# Patient Record
Sex: FEMALE | Race: ASIAN | Employment: UNEMPLOYED | URBAN - METROPOLITAN AREA
[De-identification: names, ages, dates, MRNs, and addresses within clinical notes are randomized per-mention and may not be internally consistent; named-entity substitution may affect disease eponyms.]

---

## 2020-07-06 ENCOUNTER — OFFICE VISIT (OUTPATIENT)
Dept: ORTHOPEDIC SURGERY | Age: 45
End: 2020-07-06

## 2020-07-06 VITALS
OXYGEN SATURATION: 100 % | HEART RATE: 88 BPM | DIASTOLIC BLOOD PRESSURE: 63 MMHG | HEIGHT: 62 IN | SYSTOLIC BLOOD PRESSURE: 103 MMHG | WEIGHT: 157.4 LBS | RESPIRATION RATE: 12 BRPM | BODY MASS INDEX: 28.97 KG/M2 | TEMPERATURE: 98.2 F

## 2020-07-06 DIAGNOSIS — R20.0 NUMBNESS AND TINGLING OF RIGHT ARM: Primary | ICD-10-CM

## 2020-07-06 DIAGNOSIS — M46.1 SACROILIITIS (HCC): ICD-10-CM

## 2020-07-06 DIAGNOSIS — G54.0 THORACIC OUTLET SYNDROME: ICD-10-CM

## 2020-07-06 DIAGNOSIS — M25.512 CHRONIC LEFT SHOULDER PAIN: ICD-10-CM

## 2020-07-06 DIAGNOSIS — R20.2 NUMBNESS AND TINGLING OF RIGHT ARM: Primary | ICD-10-CM

## 2020-07-06 DIAGNOSIS — M54.50 LUMBAR SPINE PAIN: ICD-10-CM

## 2020-07-06 DIAGNOSIS — M53.3 SACROILIAC JOINT DYSFUNCTION OF RIGHT SIDE: ICD-10-CM

## 2020-07-06 DIAGNOSIS — M54.2 NECK PAIN: ICD-10-CM

## 2020-07-06 DIAGNOSIS — M54.12 CERVICAL RADICULOPATHY: ICD-10-CM

## 2020-07-06 DIAGNOSIS — G89.29 CHRONIC LEFT SHOULDER PAIN: ICD-10-CM

## 2020-07-06 RX ORDER — CYCLOBENZAPRINE HCL 10 MG
10 TABLET ORAL 2 TIMES DAILY
COMMUNITY

## 2020-07-06 NOTE — PROGRESS NOTES
[address]  [address], [address]  [place], [address] [place] [address] Street  Phone: [phone]  Fax: [phone]        Lex Barrera  : 1975  PCP: Iglesia Limon NP  2020    NEW PATIENT      HISTORY OF PRESENT ILLNESS  Garret Mccallum is a 39 y.o. female c/o neck pain radiating into the BUE that began after she fell when she was putting [place] decorations into the attic. In January, she began having limited ROM of both arms, and she has had difficulty putting on her bra, etc. She c/o numbness in the RUE from the elbow to the hand into all digits except the pinky. She wears a wrist brace on the right with no benefit. She c/o limited ROM of both shoulders (L>R) due to pain. Pt notes that she has been unable to drive because her RUE feels weak and numb. She also c/o low back pain radiating into the RLE into the bottom of the foot x 2 years. She has attended PT with some benefit. Pain Score: 7/10. Treatments patient has tried:  Physical therapy:Pending  Doing HEP: Unknown  Non-opioid medications: Yes - Flexeril  Spinal injections: No  Spinal surgery- No.   Last Spine MRI: None. PmHx: None. ASSESSMENT  This is a 39year-old female with neck pain with radiating numbness in the RUE and left shoulder pain following a fall out of the attic. Her RUE symptoms may be thoracic outlet syndrome vs peripheral entrapment, less likely a cervical radiculopathy. She also c/o low back pain radiating into the RLE. This pain is likely SI joint dysfunction on the R - she had 5/5 positive SI test on the R. PLAN  1. RUE EMG to evaluate thoracic outlet vs peripheral entrapment. 2. Referral to orthopedics for shoulder pain. 3. Right SI joint injection. 4. Referral to PT [facility])  5. Referral to chiropractic (Dr. Catherien Rodriguez)    Pt will f/u for RUE EMG or sooner if needed. Diagnoses and all orders for this visit:    1.  Numbness and tingling of right arm  -     EMG ONE EXTREMITY UPPER RT; Future  -     REFERRAL TO PHYSICAL THERAPY    2. Chronic left shoulder pain  -     REFERRAL TO ORTHOPEDICS  -     REFERRAL TO PHYSICAL THERAPY    3. Sacroiliitis ([address]  -     REFERRAL TO PHYSICAL THERAPY  -     SCHEDULE SURGERY    4. Sacroiliac joint dysfunction of right side  -     REFERRAL TO PHYSICAL THERAPY  -     SCHEDULE SURGERY    5. Thoracic outlet syndrome  -     EMG ONE EXTREMITY UPPER RT; Future  -     REFERRAL TO PHYSICAL THERAPY    6. Neck pain  -     REFERRAL TO PHYSICAL THERAPY  -     REFERRAL TO CHIROPRACTIC    7. Lumbar spine pain  -     REFERRAL TO PHYSICAL THERAPY  -     REFERRAL TO CHIROPRACTIC    8. Cervical radiculopathy  -     EMG ONE EXTREMITY UPPER RT; Future  -     REFERRAL TO PHYSICAL THERAPY         CHIEF COMPLAINT  Dionicio Guthrie is seen today in consultation at the request of Tori Martinez NP for complaints of neck pain radiating into RUE. PAST MEDICAL HISTORY   Past Medical History:   Diagnosis Date    History of uterine fibroid        Past Surgical History:   Procedure Laterality Date    HX  SECTION      x2       MEDICATIONS        ALLERGIES  No Known Allergies       SOCIAL HISTORY    Social History     Socioeconomic History    Marital status:      Spouse name: Not on file    Number of children: Not on file    Years of education: Not on file    Highest education level: Not on file   Tobacco Use    Smoking status: Never Smoker    Smokeless tobacco: Never Used   Substance and Sexual Activity    Alcohol use: Yes     Comment: occassionally       FAMILY HISTORY  History reviewed. No pertinent family history. REVIEW OF SYSTEMS  Review of Systems   Musculoskeletal: Positive for back pain and neck pain.         RUE paraesthesia  Left shoulder pain  RLE paraesthesia         PHYSICAL EXAMINATION  Visit Vitals  /63   Pulse 88   Temp 98.2 °F (36.8 °C) (Oral)   Resp 12   Ht 5' 2\" (1.575 m)   Wt 157 lb 6.4 oz (71.4 kg)   SpO2 100%   BMI 28.79 kg/m²         Pain Assessment  7/6/2020   Location of Pain Neck; Shoulder;Arm   Location Modifiers Right   Severity of Pain 7   Quality of Pain Sharp; Throbbing;Aching;Burning; Other (Comment)   Quality of Pain Comment \"numbness/tingling\"   Duration of Pain Persistent   Frequency of Pain Constant   Limiting Behavior Yes   Result of Injury No         Constitutional:  Well developed, well nourished, in no acute distress. Psychiatric: Affect and mood are appropriate. HEENT: Normocephalic, atraumatic. Extraocular movements intact. Integumentary: No rashes or abrasions noted on exposed areas. Cardiovascular: Regular rate and rhythm. Pulmonary: Clear to auscultation bilaterally. SPINE/MUSCULOSKELETAL EXAM    Cervical spine:  Neck is midline. Normal muscle tone. No focal atrophy is noted. ROM pain free. Shoulder ROM intact. Mild tenderness to palpation. Negative Spurling's sign. Negative Tinel's sign. Negative Lafleur's sign. Positive JAME test on the R.               Sensation in the bilateral arms grossly intact to light touch. Positive tinel at the right elbow (cubital tunnel). Positive tinel at the right wrist (carpal tunnel)    Lumbar spine:  No rash, ecchymosis, or gross obliquity. No fasciculations. No focal atrophy is noted. No pain with hip ROM. Full range of motion. Tenderness to palpation of right-sided lumbar paraspinals. No tenderness to palpation at the sciatic notch. SI joint tender on the R. Trochanters non tender. Sensation in the bilateral legs grossly intact to light touch.        LEFT RIGHT   MARCELA TEST + +   P4 TEST - +   COMPRESSION TEST + +   DISTRACTION TEST + +   GAENSLEN'S TEST - +         MOTOR:      Biceps  Triceps Deltoids Wrist Ext Wrist Flex Hand Intrin   Right 5/5 5/5 5/5 5/5 5/5 5/5   Left 5/5 5/5 5/5 5/5 5/5 5/5             Hip Flex  Quads Hamstrings Ankle DF EHL Ankle PF   Right 5/5 5/5 5/5 5/5 5/5 5/5   Left 5/5 5/5 5/5 5/5 5/5 5/5     DTRs are 2+ biceps, triceps, brachioradialis, patella, and Achilles. Negative Straight Leg raise. Squat not tested. No difficulty with tandem gait. Ambulation with single point cane. FWB. RADIOGRAPHS/DATA  Cervical Spine XR images taken on 2/12/2020 personally reviewed with patient:  VERTEBRAE AND ALIGNMENT: Segments are normal in height and alignment. DISC SPACES: Little or no significant disc space narrowing. FACET JOINTS AND FORAMINA: No significant facet DJD or bony foraminal stenosis. PARASPINOUS SOFT TISSUES: Unremarkable. ADDITIONAL: None.    _______________    IMPRESSION    No significant abnormality.  reviewed    Ms. Boyd Saravia has a reminder for a \"due or due soon\" health maintenance. I have asked that she contact her primary care provider for follow-up on this health maintenance. 35 minutes of face-to-face contact were spent with the patient during today's visit extensively discussing symptoms and treatment plan. All questions were answered. More than half of this visit today was spent on counseling. Written by Rashaun Jensen, as dictated by Dr. Joya Patino. I, Dr. Joya Patino, confirm that all documentation is accurate.

## 2020-07-06 NOTE — H&P (VIEW-ONLY)
[address]  [address], [address]  [place], [facility]  Phone: [phone]  Fax: [phone]        Tylor Pena  : 1975  PCP: Parisa Hall NP  2020    NEW PATIENT      HISTORY OF PRESENT ILLNESS  Maria C Hodges is a 39 y.o. female c/o neck pain radiating into the BUE that began after she fell when she was putting [place] decorations into the attic. In January, she began having limited ROM of both arms, and she has had difficulty putting on her bra, etc. She c/o numbness in the RUE from the elbow to the hand into all digits except the pinky. She wears a wrist brace on the right with no benefit. She c/o limited ROM of both shoulders (L>R) due to pain. Pt notes that she has been unable to drive because her RUE feels weak and numb. She also c/o low back pain radiating into the RLE into the bottom of the foot x 2 years. She has attended PT with some benefit. Pain Score: 7/10. Treatments patient has tried:  Physical therapy:Pending  Doing HEP: Unknown  Non-opioid medications: Yes - Flexeril  Spinal injections: No  Spinal surgery- No.   Last Spine MRI: None. PmHx: None. ASSESSMENT  This is a 39year-old female with neck pain with radiating numbness in the RUE and left shoulder pain following a fall out of the attic. Her RUE symptoms may be thoracic outlet syndrome vs peripheral entrapment, less likely a cervical radiculopathy. She also c/o low back pain radiating into the RLE. This pain is likely SI joint dysfunction on the R - she had 5/5 positive SI test on the R. PLAN  1. RUE EMG to evaluate thoracic outlet vs peripheral entrapment. 2. Referral to orthopedics for shoulder pain. 3. Right SI joint injection. 4. Referral to PT [facility])  5. Referral to chiropractic (Dr. Greg Irby)    Pt will f/u for RUE EMG or sooner if needed. Diagnoses and all orders for this visit:    1.  Numbness and tingling of right arm  -     EMG ONE EXTREMITY UPPER RT; Future  -     REFERRAL TO PHYSICAL THERAPY    2. Chronic left shoulder pain  -     REFERRAL TO ORTHOPEDICS  -     REFERRAL TO PHYSICAL THERAPY    3. Sacroiliitis ([address]  -     REFERRAL TO PHYSICAL THERAPY  -     SCHEDULE SURGERY    4. Sacroiliac joint dysfunction of right side  -     REFERRAL TO PHYSICAL THERAPY  -     SCHEDULE SURGERY    5. Thoracic outlet syndrome  -     EMG ONE EXTREMITY UPPER RT; Future  -     REFERRAL TO PHYSICAL THERAPY    6. Neck pain  -     REFERRAL TO PHYSICAL THERAPY  -     REFERRAL TO CHIROPRACTIC    7. Lumbar spine pain  -     REFERRAL TO PHYSICAL THERAPY  -     REFERRAL TO CHIROPRACTIC    8. Cervical radiculopathy  -     EMG ONE EXTREMITY UPPER RT; Future  -     REFERRAL TO PHYSICAL THERAPY         CHIEF COMPLAINT  Pendroy Pipes is seen today in consultation at the request of Martin Kline NP for complaints of neck pain radiating into RUE. PAST MEDICAL HISTORY   Past Medical History:   Diagnosis Date    History of uterine fibroid        Past Surgical History:   Procedure Laterality Date    HX  SECTION      x2       MEDICATIONS        ALLERGIES  No Known Allergies       SOCIAL HISTORY    Social History     Socioeconomic History    Marital status:      Spouse name: Not on file    Number of children: Not on file    Years of education: Not on file    Highest education level: Not on file   Tobacco Use    Smoking status: Never Smoker    Smokeless tobacco: Never Used   Substance and Sexual Activity    Alcohol use: Yes     Comment: occassionally       FAMILY HISTORY  History reviewed. No pertinent family history. REVIEW OF SYSTEMS  Review of Systems   Musculoskeletal: Positive for back pain and neck pain.         RUE paraesthesia  Left shoulder pain  RLE paraesthesia         PHYSICAL EXAMINATION  Visit Vitals  /63   Pulse 88   Temp 98.2 °F (36.8 °C) (Oral)   Resp 12   Ht 5' 2\" (1.575 m)   Wt 157 lb 6.4 oz (71.4 kg)   SpO2 100%   BMI 28.79 kg/m²         Pain Assessment  7/6/2020   Location of Pain Neck; Shoulder;Arm   Location Modifiers Right   Severity of Pain 7   Quality of Pain Sharp; Throbbing;Aching;Burning; Other (Comment)   Quality of Pain Comment \"numbness/tingling\"   Duration of Pain Persistent   Frequency of Pain Constant   Limiting Behavior Yes   Result of Injury No         Constitutional:  Well developed, well nourished, in no acute distress. Psychiatric: Affect and mood are appropriate. HEENT: Normocephalic, atraumatic. Extraocular movements intact. Integumentary: No rashes or abrasions noted on exposed areas. Cardiovascular: Regular rate and rhythm. Pulmonary: Clear to auscultation bilaterally. SPINE/MUSCULOSKELETAL EXAM    Cervical spine:  Neck is midline. Normal muscle tone. No focal atrophy is noted. ROM pain free. Shoulder ROM intact. Mild tenderness to palpation. Negative Spurling's sign. Negative Tinel's sign. Negative Lafleur's sign. Positive JAME test on the R.               Sensation in the bilateral arms grossly intact to light touch. Positive tinel at the right elbow (cubital tunnel). Positive tinel at the right wrist (carpal tunnel)    Lumbar spine:  No rash, ecchymosis, or gross obliquity. No fasciculations. No focal atrophy is noted. No pain with hip ROM. Full range of motion. Tenderness to palpation of right-sided lumbar paraspinals. No tenderness to palpation at the sciatic notch. SI joint tender on the R. Trochanters non tender. Sensation in the bilateral legs grossly intact to light touch.        LEFT RIGHT   MARCELA TEST + +   P4 TEST - +   COMPRESSION TEST + +   DISTRACTION TEST + +   GAENSLEN'S TEST - +         MOTOR:      Biceps  Triceps Deltoids Wrist Ext Wrist Flex Hand Intrin   Right 5/5 5/5 5/5 5/5 5/5 5/5   Left 5/5 5/5 5/5 5/5 5/5 5/5             Hip Flex  Quads Hamstrings Ankle DF EHL Ankle PF   Right 5/5 5/5 5/5 5/5 5/5 5/5   Left 5/5 5/5 5/5 5/5 5/5 5/5     DTRs are 2+ biceps, triceps, brachioradialis, patella, and Achilles. Negative Straight Leg raise. Squat not tested. No difficulty with tandem gait. Ambulation with single point cane. FWB. RADIOGRAPHS/DATA  Cervical Spine XR images taken on 2/12/2020 personally reviewed with patient:  VERTEBRAE AND ALIGNMENT: Segments are normal in height and alignment. DISC SPACES: Little or no significant disc space narrowing. FACET JOINTS AND FORAMINA: No significant facet DJD or bony foraminal stenosis. PARASPINOUS SOFT TISSUES: Unremarkable. ADDITIONAL: None.    _______________    IMPRESSION    No significant abnormality.  reviewed    Ms. Kojo Hammond has a reminder for a \"due or due soon\" health maintenance. I have asked that she contact her primary care provider for follow-up on this health maintenance. 35 minutes of face-to-face contact were spent with the patient during today's visit extensively discussing symptoms and treatment plan. All questions were answered. More than half of this visit today was spent on counseling. Written by Teofilo Banegas, as dictated by Dr. Veronica Vázquez. I, Dr. Veronica Vázquez, confirm that all documentation is accurate.

## 2020-07-06 NOTE — PATIENT INSTRUCTIONS
Electromyogram (EMG) and Nerve Conduction Studies: About These Tests What are they? An electromyogram (EMG) measures the electrical activity of your muscles when you are not using them (at rest) and when you tighten them (muscle contraction). Nerve conduction studies (NCS) measure how well and how fast the nerves can send electrical signals. EMG and nerve conduction studies are often done together. If they are done together, the nerve conduction studies are done before the EMG. Why are they done? You may need an EMG to find diseases that damage your muscles or nerves or to find why you can't move your muscles (paralysis), why they feel weak, or why they twitch. These problems may include a herniated disc, amyotrophic lateral sclerosis (ALS), or myasthenia gravis (MG). You may need nerve conduction studies to find damage to the nerves that lead from the brain and spinal cord to the rest of the body. (This is called the peripheral nervous system.) These studies are often used to help find nerve disorders, such as carpal tunnel syndrome. How do you prepare for these tests? · Wear loose-fitting clothing. You may be given a hospital gown to wear. · The electrodes for the test are attached to your skin. Your skin needs to be clean and free of sprays, oils, creams, and lotions. · You may be asked to sign a consent form that says you understand the risks of the test and agree to have it done. · Tell your doctor ALL the medicines, vitamins, supplements, and herbal remedies you take. Some may increase the risk of problems during your test. Your doctor will tell you if you should stop taking any of them before the test and how soon to do it. · If you take aspirin or some other blood thinner, ask your doctor if you should stop taking it before your test. Make sure that you understand exactly what your doctor wants you to do. These medicines increase the risk of bleeding. How are the tests done? You lie on a table or bed or sit in a reclining chair so your muscles are relaxed. For an EMG:  
· Your doctor will insert a needle electrode into a muscle. This will record the electrical activity while the muscle is at rest. 
· Your doctor will ask you to tighten the same muscle slowly and steadily while the electrical activity is recorded. · Your doctor may move the electrode to a different area of the muscle or a different muscle. For nerve conduction studies:  
· Your doctor will attach two types of electrodes to your skin. ? One type of electrode is placed over a nerve and will give the nerve an electrical pulse. ? The other type of electrode is placed over the muscle that the nerve controls. It will record how long it takes the muscle to react to the electrical pulse. How does having electromyogram (EMG) and nerve conduction studies feel? During an EMG test, you may feel a quick, sharp pain when the needle electrode is put into a muscle. With nerve conduction studies, you will be able to feel the electrical pulses. The tests make some people anxious. Keep in mind that only a very low-voltage electrical current is used. And each electrical pulse is very quick. It lasts less than a second. How long do they take? · An EMG may take 30 to 60 minutes. · Nerve conduction tests may take from 15 minutes to 1 hour or more. It depends on how many nerves and muscles your doctor tests. What happens after these tests? · After the test, you may be sore and feel a tingling in your muscles. This may last for up to 2 days. · If any of the test areas are sore: 
? Put ice or a cold pack on the area for 10 to 20 minutes at a time. Put a thin cloth between the ice and your skin. ? Take an over-the-counter pain medicine, such as acetaminophen (Tylenol), ibuprofen (Advil, Motrin), or naproxen (Aleve). Be safe with medicines. Read and follow all instructions on the label. · You will probably be able to go home right away. It depends on the reason for the test. 
· You can go back to your usual activities right away. When should you call for help? Watch closely for changes in your health, and be sure to contact your doctor if: · Muscle pain from an EMG test gets worse or you have swelling, tenderness, or pus at any of the needle sites. · You have any problems that you think may be from the test. 
· You have any questions about the test or have not received your results. Follow-up care is a key part of your treatment and safety. Be sure to make and go to all appointments, and call your doctor if you are having problems. It's also a good idea to keep a list of the medicines you take. Ask your doctor when you can expect to have your test results. Where can you learn more? Go to http://www.gray.com/ Enter N856 in the search box to learn more about \"Electromyogram (EMG) and Nerve Conduction Studies: About These Tests. \" Current as of: November 20, 2019               Content Version: 12.5 © 0530-7112 [employer]. Care instructions adapted under license by "[employer] (which disclaims liability or warranty for this information). If you have questions about a medical condition or this instruction, always ask your healthcare professional. [address] any warranty or liability for your use of this information.

## 2020-07-13 ENCOUNTER — HOSPITAL ENCOUNTER (OUTPATIENT)
Dept: PHYSICAL THERAPY | Age: 45
Discharge: HOME | End: 2020-07-13
Payer: OTHER GOVERNMENT

## 2020-07-13 PROCEDURE — 97162 PT EVAL MOD COMPLEX 30 MIN: CPT

## 2020-07-13 PROCEDURE — 97110 THERAPEUTIC EXERCISES: CPT

## 2020-07-13 PROCEDURE — 97112 NEUROMUSCULAR REEDUCATION: CPT

## 2020-07-13 NOTE — PROGRESS NOTES
[facility] 45  [address], [address] [phone] fax    Plan of Care/ Statement of Necessity for Physical Therapy Services           Patient name: Abe Offer Start of Care: 2020   Referral source: Zain Swartz MD : 1975    Medical Diagnosis: Pain in left shoulder [M25.512]  Cervicalgia [M54.2]  Low back pain [M54.5]  Numbness and tingling of right arm [R20.0, R20.2]  Payor: [place] / Plan: [address] / Product Type: [address] [place] [id number] /  Onset Date:chronic with exacerbation 2019    Treatment Diagnosis: LBP, left shoulder pain, right carpal tunnel    Prior Hospitalization: see medical history Provider#: [id number]   Medications: Verified on Patient summary List    Comorbidities: history of LBP   Prior Level of Function: functionally (I)    The Plan of Care and following information is based on the information from the initial evaluation. Assessment/ key information:   Ms. Tonja Sheridan is a 37yo female who presents to PT with signs and symptoms consistent with left RTC tendinosis, right carpal tunnel, and mechanical LBP that was exacerbated by a fall from the attic in 2019. Pt demonstrates decreased left shoulder AROM with infraspinatus trigger points, right positive Tinel and Phalen's test with decreased  strength, and poor TA endurance. Pt deficits impair her fine motor control, ability to reach overhead, don undergarments, and tolerate prolonged sitting/standing and walking.  Pt will benefit from skilled PT in order to address listed deficits, decrease pain, and maximize functional potential.     Evaluation Complexity History MEDIUM  Complexity : 1-2 comorbidities / personal factors will impact the outcome/ POC ; Examination HIGH Complexity : 4+ Standardized tests and measures addressing body structure, function, activity limitation and / or participation in recreation  ;Presentation MEDIUM Complexity : Evolving with changing characteristics  ; Clinical Decision Making MEDIUM Complexity : FOTO score of 26-74  Overall Complexity Rating: MEDIUM  Problem List: pain affecting function, decrease ROM, decrease strength, decrease ADL/ functional abilitiies, decrease activity tolerance and decrease flexibility/ joint mobility   Treatment Plan may include any combination of the following: Therapeutic exercise, Therapeutic activities, Neuromuscular re-education, Physical agent/modality, Gait/balance training, Manual therapy, Patient education, Self Care training, Functional mobility training and Home safety training  Patient / Family readiness to learn indicated by: asking questions, trying to perform skills and interest  Persons(s) to be included in education: patient (P)  Barriers to Learning/Limitations: None  Patient Goal (s): avoid medications, surgeries as much as possible  Patient Self Reported Health Status: fair  Rehabilitation Potential: excellent  Short Term Goals: To be accomplished in 2 weeks:  1. Pt will report compliance with HEP in order to supplement PT treatment   Eval = established  2. Pt will demonstrate left shoulder IR to T10 in order to improve ability to don undergarments   Eval = PSIS    Long Term Goals: To be accomplished in 9 treatments:  1. Pt will score at least 47 on FOTO in order to improve overall function, decrease pain, and facilitate return to PLOF. Eval = 28  2. Pt will demonstrate bilateral hip extension strength 5-/5 in order to increased ambulation distances in the community   Eval = bilaterally 4/5  3. Pt will demonstrate left shoulder strength at least 5-/5 in order to improve ability to lift objects overhead at home   Eval = 4/5 into all planes  4. Pt will demonstrate right  strength at least 43lbs in order to decrease frequency of dropping dishes at home   Eval = 35lbs. Frequency / Duration: Patient to be seen 2 times per week for 9 treatments.     Patient/ Caregiver education and instruction: Diagnosis, prognosis, self care, activity modification and exercises   [x]  Plan of care has been reviewed with MIKHAIL Varghese, PT 7/13/2020 2:06 PM    ________________________________________________________________________    I certify that the above Therapy Services are being furnished while the patient is under my care. I agree with the treatment plan and certify that this therapy is necessary.     Physician's Signature:____________Date:_________TIME:________    ** Signature, Date and Time must be completed for valid certification **    Please sign and return to [facility] 45  [address] [facility], [address] [phone] fax

## 2020-07-13 NOTE — PROGRESS NOTES
PT DAILY TREATMENT NOTE 10-18    Patient Name: Celestino Lewis  Date:2020  : 1975  [x]  Patient  Verified  Payor:  / Plan: [facility] / Product Type:  /    In time:115  Out time:201  Total Treatment Time (min): 46  Visit #: 1 of 9    Treatment Area: Pain in left shoulder [M25.512]  Cervicalgia [M54.2]  Low back pain [M54.5]  Numbness and tingling of right arm [R20.0, R20.2]    SUBJECTIVE  Pain Level (0-10 scale): 6.5  Any medication changes, allergies to medications, adverse drug reactions, diagnosis change, or new procedure performed?: [x] No    [] Yes (see summary sheet for update)  Subjective functional status/changes:   [] No changes reported  See evaluation     OBJECTIVE    26 min [x]Eval                  []Re-Eval        10 min Therapeutic Exercise:  [x] See flow sheet : explanation, demonstration, performance and distribution of HEP   Rationale: increase ROM and increase strength to improve the patients ability to perform ADLs    10 min Neuromuscular Re-education:  [x]  See flow sheet : explanation, demonstration, performance and distribution of HEP   Rationale: increase strength, improve coordination and increase proprioception  to improve the patients ability to tolerate sustained postures          With   [] TE   [] TA   [] neuro   [] other: Patient Education: [x] Review HEP    [] Progressed/Changed HEP based on:   [] positioning   [] body mechanics   [] transfers   [] heat/ice application    [] other:      Other Objective/Functional Measures: see evaluatino      Pain Level (0-10 scale) post treatment: 6    ASSESSMENT/Changes in Function: see POC    Patient will continue to benefit from skilled PT services to modify and progress therapeutic interventions, address functional mobility deficits, address ROM deficits, address strength deficits, analyze and address soft tissue restrictions, analyze and cue movement patterns, analyze and modify body mechanics/ergonomics, assess and modify postural abnormalities, address imbalance/dizziness and instruct in home and community integration to attain remaining goals. [x]  See Plan of Care  []  See progress note/recertification  []  See Discharge Summary         Progress towards goals / Updated goals:  Short Term Goals: To be accomplished in 2 weeks:  1. Pt will report compliance with HEP in order to supplement PT treatment   Eval = established  2. Pt will demonstrate left shoulder IR to T10 in order to improve ability to don undergarments   Eval = PSIS    Long Term Goals: To be accomplished in 9 treatments:  1. Pt will score at least 47 on FOTO in order to improve overall function, decrease pain, and facilitate return to PLOF. Eval = 28  2. Pt will demonstrate bilateral hip extension strength 5-/5 in order to increased ambulation distances in the community   Eval = bilaterally 4/5  3. Pt will demonstrate left shoulder strength at least 5-/5 in order to improve ability to lift objects overhead at home   Eval = 4/5 into all planes  4. Pt will demonstrate right  strength at least 43lbs in order to decrease frequency of dropping dishes at home   Eval = 35lbs.        PLAN  [x]  Upgrade activities as tolerated     [x]  Continue plan of care  []  Update interventions per flow sheet       []  Discharge due to:_  []  Other:_      Lesa Lin, PT 7/13/2020  2:06 PM    Future Appointments   Date Time Provider Trisha Bundy   8/10/2020  9:30 AM Nasreen Dickey  [address]   8/11/2020  4:30 PM [facility] RM 4 3D [facility]

## 2020-07-14 ENCOUNTER — APPOINTMENT (OUTPATIENT)
Dept: GENERAL RADIOLOGY | Age: 45
End: 2020-07-14
Attending: PHYSICAL MEDICINE & REHABILITATION
Payer: OTHER GOVERNMENT

## 2020-07-14 ENCOUNTER — HOSPITAL ENCOUNTER (OUTPATIENT)
Age: 45
Setting detail: OUTPATIENT SURGERY
Discharge: HOME | End: 2020-07-14
Attending: PHYSICAL MEDICINE & REHABILITATION | Admitting: PHYSICAL MEDICINE & REHABILITATION
Payer: OTHER GOVERNMENT

## 2020-07-14 VITALS
OXYGEN SATURATION: 97 % | HEART RATE: 81 BPM | TEMPERATURE: 93.9 F | SYSTOLIC BLOOD PRESSURE: 110 MMHG | DIASTOLIC BLOOD PRESSURE: 68 MMHG | RESPIRATION RATE: 20 BRPM

## 2020-07-14 LAB — HCG UR QL: NEGATIVE

## 2020-07-14 PROCEDURE — 76010000009 HC PAIN MGT 0 TO 30 MIN PROC: Performed by: PHYSICAL MEDICINE & REHABILITATION

## 2020-07-14 PROCEDURE — 74011000250 HC RX REV CODE- 250: Performed by: PHYSICAL MEDICINE & REHABILITATION

## 2020-07-14 PROCEDURE — 77030039433 HC TY MYLEOGRAM BD -B: Performed by: PHYSICAL MEDICINE & REHABILITATION

## 2020-07-14 PROCEDURE — 74011250637 HC RX REV CODE- 250/637: Performed by: PHYSICAL MEDICINE & REHABILITATION

## 2020-07-14 PROCEDURE — 81025 URINE PREGNANCY TEST: CPT

## 2020-07-14 PROCEDURE — 74011636320 HC RX REV CODE- 636/320: Performed by: PHYSICAL MEDICINE & REHABILITATION

## 2020-07-14 PROCEDURE — 74011250636 HC RX REV CODE- 250/636: Performed by: PHYSICAL MEDICINE & REHABILITATION

## 2020-07-14 RX ORDER — DEXAMETHASONE SODIUM PHOSPHATE 100 MG/10ML
INJECTION INTRAMUSCULAR; INTRAVENOUS AS NEEDED
Status: DISCONTINUED | OUTPATIENT
Start: 2020-07-14 | End: 2020-07-14 | Stop reason: HOSPADM

## 2020-07-14 RX ORDER — LIDOCAINE HYDROCHLORIDE 10 MG/ML
INJECTION, SOLUTION EPIDURAL; INFILTRATION; INTRACAUDAL; PERINEURAL AS NEEDED
Status: DISCONTINUED | OUTPATIENT
Start: 2020-07-14 | End: 2020-07-14 | Stop reason: HOSPADM

## 2020-07-14 RX ORDER — DIAZEPAM 5 MG/1
2.5-1 TABLET ORAL ONCE
Status: COMPLETED | OUTPATIENT
Start: 2020-07-14 | End: 2020-07-14

## 2020-07-14 RX ADMIN — DIAZEPAM 5 MG: 5 TABLET ORAL at 07:43

## 2020-07-14 NOTE — PROCEDURES
Procedure Note    Patient Name: Haley Lorenzo    Date of Procedure: July 14, 2020    Preoperative Diagnosis:Sacroiliac Dysfunction    Post Operative Diagnosis: same    Procedure: SI Joint Injection, Intra-articular and extra-articular - Unilateral  right    Consent: Informed consent was obtained prior to the procedure. The patient was given the opportunity to ask questions regarding the procedure and its associated risks. In addition to the potential risks associated with the procedure itself, the patient was informed both verbally and in writing of potential side effects of the use of corticosteroids. The patient appeared to comprehend the informed consent and desired to have the procedure performed. The patient was counseled at length about the risks of redd Covid-19 during their perioperative period and any recovery window from their procedure. The patient was made aware that redd Covid-19  may worsen their prognosis for recovering from their procedure and lend to a higher morbidity and/or mortality risk. All material risks, benefits, and reasonable alternatives including postponing the procedure were discussed. The patient does  wish to proceed with the procedure at this time. Procedure: The patient was placed in the prone position on the flouroscopy table and the back was prepped and draped in the usual sterile manner. After local Lidocaine 1% infiltration, a #22 gauge spinal needle was then advanced to lie within the Sacroiliac Joint. Yes a small amount of Isovue was used to confirm placement, no vascular uptake was identified. A total of 10 mg of Dexamethasone  and 5 ml of Lidocaine was introduced in and around the joint. The injection area was cleaned and bandaids applied. No excessive bleeding was noted. Patient dressed and was discharged to home with instructions. Discussion:  The patient tolerated the procedure well.         Danielle Salvador MD  July 14, 2020

## 2020-07-14 NOTE — INTERVAL H&P NOTE
Update History & Physical    The Patient's History and Physical of July 6, 2020 was reviewed with the patient and I examined the patient. There was no change. The surgical site was confirmed by the patient and me. Plan:  The risk, benefits, expected outcome, and alternative to the recommended procedure have been discussed with the patient. Patient understands and wants to proceed with the procedure.     Electronically signed by Tan Cotto MD on 7/14/2020 at 8:33 AM

## 2020-07-14 NOTE — DISCHARGE INSTRUCTIONS
[facility]   ([place])  Dr. Eduarda Carrasco, Dr. Nubia Chen, Dr. Arrington Covert not drive a car, operate heavy machinery or dangerous equipment for 24 hours. * Activity as tolerated; rest for the remainder of the day. * Resume pre-block medications including those for your family doctor. * Do not drink alcoholic beverages for 24 hours. Alcohol and the medications you have received may interact and cause an adverse reaction. * You may feel better this evening and worse tomorrow, as the numbing medications wears off and the steroid has yet to begin to work. After 48 hrs the steroid should begin to release bringing you relief. * You may shower this evening and remove any bandages. * Avoid hot tubs and heating pads for 24 hours. You may use cold packs on the procedure site as tolerated for the first 24 hours. * If a headache develops, drink plenty of fluids and rest.  Take over the counter medications for headache if needed. If the headache continues longer than 24 hours, call MD at the [address] [place] [address]. [phone]    * Continue taking pain medications as needed. * You may resume your regular diet if tolerated. Otherwise, start with sips of water and advance slowly. * If Diabetic: check your blood sugar three times a day for the next 3 days. If your sugar is greater than 300 call your family doctor. If your sugar is greater than 400, have someone transport you to the nearest Emergency Room. * If you experience any of the following problems, Please Call the [address] [place] [address] at [phone].         * Shortness of Breath    * Fever of 101 or higher    * Nausea / Vomiting    * Severe Headache    * Weakness or numbness in arms or legs that is not      resolving    * Prolonged increase in pain greater than 4 days      DISCHARGE SUMMARY from Nurse      PATIENT INSTRUCTIONS:    After oral sedation, for 24 hours or while taking prescription Narcotics:  · Limit your activities  · Do not drive and operate hazardous machinery  · Do not make important personal or business decisions  · Do  not drink alcoholic beverages  · If you have not urinated within 8 hours after discharge, please contact your surgeon on call. Report the following to your surgeon:  · Excessive pain, swelling, redness or odor of or around the surgical area  · Temperature over 101  · Nausea and vomiting lasting longer than 4 hours or if unable to take medications  · Any signs of decreased circulation or nerve impairment to extremity: change in color, persistent  numbness, tingling, coldness or increase pain  · Any questions            What to do at Home:  Recommended activity: Activity as tolerated, NO DRIVING FOR 12 Hours post injection          *  Please give a list of your current medications to your Primary Care Provider. *  Please update this list whenever your medications are discontinued, doses are      changed, or new medications (including over-the-counter products) are added. *  Please carry medication information at all times in case of emergency situations. These are general instructions for a healthy lifestyle:    No smoking/ No tobacco products/ Avoid exposure to second hand smoke    Surgeon General's Warning:  Quitting smoking now greatly reduces serious risk to your health. Obesity, smoking, and sedentary lifestyle greatly increases your risk for illness    A healthy diet, regular physical exercise & weight monitoring are important for maintaining a healthy lifestyle    You may be retaining fluid if you have a history of heart failure or if you experience any of the following symptoms:  Weight gain of 3 pounds or more overnight or 5 pounds in a week, increased swelling in our hands or feet or shortness of breath while lying flat in bed.   Please call your doctor as soon as you notice any of these symptoms; do not wait until your next office visit. Recognize signs and symptoms of STROKE:    F-face looks uneven    A-arms unable to move or move unevenly    S-speech slurred or non-existent    T-time-call 911 as soon as signs and symptoms begin-DO NOT go       Back to bed or wait to see if you get better-TIME IS BRAIN.

## 2020-07-17 ENCOUNTER — HOSPITAL ENCOUNTER (OUTPATIENT)
Dept: PHYSICAL THERAPY | Age: 45
Discharge: HOME | End: 2020-07-17
Payer: OTHER GOVERNMENT

## 2020-07-17 PROCEDURE — 97112 NEUROMUSCULAR REEDUCATION: CPT

## 2020-07-17 PROCEDURE — 97110 THERAPEUTIC EXERCISES: CPT

## 2020-07-17 NOTE — PROGRESS NOTES
PT DAILY TREATMENT NOTE 10-18    Patient Name: Herberth Felipe  Date:2020  : 1975  [x]  Patient  Verified  Payor: DEB / Plan: [facility]in Aguirre 74 / Product Type: Jarrod [employer] /    In time:1016  Out time:1103  Total Treatment Time (min): 47  Visit #: 2 of 9    Treatment Area: Pain in left shoulder [M25.512]  Cervicalgia [M54.2]  Low back pain [M54.5]  Numbness and tingling of right arm [R20.0, R20.2]    SUBJECTIVE  Pain Level (0-10 scale): 5  Any medication changes, allergies to medications, adverse drug reactions, diagnosis change, or new procedure performed?: [x] No    [] Yes (see summary sheet for update)  Subjective functional status/changes:   [] No changes reported  'sore. \"    OBJECTIVE    23Modality rationale: PD   Min Type Additional Details    [] Estim:  []Unatt       []IFC  []Premod                        []Other:  []w/ice   []w/heat  Position:  Location:    [] Estim: []Att    []TENS instruct  []NMES                    []Other:  []w/US   []w/ice   []w/heat  Position:  Location:    []  Traction: [] Cervical       []Lumbar                       [] Prone          []Supine                       []Intermittent   []Continuous Lbs:  [] before manual  [] after manual    []  Ultrasound: []Continuous   [] Pulsed                           []1MHz   []3MHz W/cm2:  Location:    []  Iontophoresis with dexamethasone         Location: [] Take home patch   [] In clinic    []  Ice     []  heat  []  Ice massage  []  Laser   []  Anodyne Position:  Location:    []  Laser with stim  []  Other:  Position:  Location:    []  Vasopneumatic Device Pressure:       [] lo [] med [] hi   Temperature: [] lo [] med [] hi   [] Skin assessment post-treatment:  []intact []redness- no adverse reaction    []redness  adverse reaction:         23 min Therapeutic Exercise:  [x]?  See flow sheet : explanation, demonstration, performance and distribution of HEP   Rationale: increase ROM and increase strength to improve the patients ability to perform ADLs     24 min Neuromuscular Re-education:  [x]? See flow sheet : explanation, demonstration, performance and distribution of HEP   Rationale: increase strength, improve coordination and increase proprioception  to improve the patients ability to tolerate sustained postures            With   [] TE   [] TA   [] neuro   [] other: Patient Education: [x] Review HEP    [] Progressed/Changed HEP based on:   [] positioning   [] body mechanics   [] transfers   [] heat/ice application    [] other:      Other Objective/Functional Measures:     Pain Level (0-10 scale) post treatment: 4.5    ASSESSMENT/Changes in Function: Ms. Hayden Cornell did well with initiation of exercises. Some cuing for squat form and median nerve glides. No change in right hand symptoms with chin tucks. Patient will continue to benefit from skilled PT services to modify and progress therapeutic interventions, address functional mobility deficits, address ROM deficits, address strength deficits, analyze and address soft tissue restrictions, analyze and cue movement patterns, analyze and modify body mechanics/ergonomics, assess and modify postural abnormalities, address imbalance/dizziness and instruct in home and community integration to attain remaining goals. []  See Plan of Care  []  See progress note/recertification  []  See Discharge Summary         Progress towards goals / Updated goals:  Short Term Goals: To be accomplished in 2 weeks:  1. Pt will report compliance with HEP in order to supplement PT treatment              Eval = established   met  2. Pt will demonstrate left shoulder IR to T10 in order to improve ability to don undergarments              Eval = PSIS      Long Term Goals: To be accomplished in 9 treatments:  1. Pt will score at least 47 on FOTO in order to improve overall function, decrease pain, and facilitate return to PLOF. Eval = 28  2.    Pt will demonstrate bilateral hip extension strength 5-/5 in order to increased ambulation distances in the community              Eval = bilaterally 4/5  3. Pt will demonstrate left shoulder strength at least 5-/5 in order to improve ability to lift objects overhead at home              Eval = 4/5 into all planes  4.    Pt will demonstrate right  strength at least 43lbs in order to decrease frequency of dropping dishes at home              Eval = 35lbs.        PLAN  []  Upgrade activities as tolerated     [x]  Continue plan of care  []  Update interventions per flow sheet       []  Discharge due to:_  []  Other:_      Apurva Hernandez, PT 7/17/2020  10:28 AM    Future Appointments   Date Time Provider Trisha Bundy   7/20/2020 11:00 AM Wyonia Cage, PT [facility]   7/23/2020 11:00 AM Wyonia Cage, PT [facility]   7/27/2020 11:00 AM Wyonia Cage, PT [facility]   7/30/2020 11:00 AM Wyonia Cage, PT [facility]   8/3/2020 11:00 AM Eleanora Frames, PT [facility]   8/6/2020 11:00 AM Wyonia Cage, PT [facility]   8/10/2020  9:30 AM John Ceballos  [address]   8/10/2020 11:00 AM Eleanora Frames, PT [facility]   8/11/2020  4:30 PM [facility] RM 4 3D [facility]

## 2020-07-20 ENCOUNTER — HOSPITAL ENCOUNTER (OUTPATIENT)
Dept: PHYSICAL THERAPY | Age: 45
Discharge: HOME | End: 2020-07-20
Payer: OTHER GOVERNMENT

## 2020-07-20 PROCEDURE — 97110 THERAPEUTIC EXERCISES: CPT

## 2020-07-20 PROCEDURE — 97112 NEUROMUSCULAR REEDUCATION: CPT

## 2020-07-20 PROCEDURE — 97530 THERAPEUTIC ACTIVITIES: CPT

## 2020-07-23 ENCOUNTER — HOSPITAL ENCOUNTER (OUTPATIENT)
Dept: PHYSICAL THERAPY | Age: 45
Discharge: HOME | End: 2020-07-23
Payer: OTHER GOVERNMENT

## 2020-07-23 PROCEDURE — 97110 THERAPEUTIC EXERCISES: CPT

## 2020-07-23 PROCEDURE — 97112 NEUROMUSCULAR REEDUCATION: CPT

## 2020-07-23 PROCEDURE — 97530 THERAPEUTIC ACTIVITIES: CPT

## 2020-07-23 NOTE — PROGRESS NOTES
PT DAILY TREATMENT NOTE 10-18    Patient Name: Edgar Nunn  Date:2020  : 1975  [x]  Patient  Verified  Payor: DEB / Plan: Eduin Aguirre 74 / Product Type:  /    In time:1100  Out time:1140  Total Treatment Time (min): 40  Visit #: 4 of 9    Treatment Area: Pain in left shoulder [M25.512]  Cervicalgia [M54.2]  Low back pain [M54.5]  Numbness and tingling of right arm [R20.0, R20.2]    SUBJECTIVE  Pain Level (0-10 scale): 5  Any medication changes, allergies to medications, adverse drug reactions, diagnosis change, or new procedure performed?: [x] No    [] Yes (see summary sheet for update)  Subjective functional status/changes:   [] No changes reported  \"I'm doing     OBJECTIVE            23Modality rationale: PD   Min Type Additional Details       []? ? Estim:  []?? Unatt       []? ?IFC  []? ?Premod                        []? ? Other:  []??w/ice   []? ?w/heat  Position:  Location:       []? ? Estim: []??Att    []? ?TENS instruct  []? ?NMES                    []? ? Other:  []??w/US   []? ?w/ice   []? ?w/heat  Position:  Location:       []? ?  Traction: []?? Cervical       []? ?Lumbar                       []? ? Prone          []? ?Supine                       []? ?Intermittent   []? ? Continuous Lbs:  []?? before manual  []? ? after manual       []? ?  Ultrasound: []??Continuous   []? ? Pulsed                           []? ?1MHz   []? ? 3MHz W/cm2:  Location:       []? ?  Iontophoresis with dexamethasone         Location: []?? Take home patch   []? ? In clinic       []? ?  Ice     []? ?  heat  []? ?  Ice massage  []? ?  Laser   []? ?  Anodyne Position:  Location:       []? ?  Laser with stim  []? ?  Other:  Position:  Location:       []? ?  Vasopneumatic Device Pressure:       []? ? lo []? ? med []?? hi   Temperature: []?? lo []? ? med []?? hi     []? ? Skin assessment post-treatment:  []??intact []? ?redness- no adverse reaction    []? ?redness  adverse reaction:         10 min Therapeutic Exercise:  [x]? ?? See flow sheet :   Rationale: increase ROM and increase strength to improve the patients ability to perform ADLs     20 min Neuromuscular Re-education:  [x]? ??  See flow sheet :    Rationale: increase strength, improve coordination and increase proprioception  to improve the patients ability to tolerate sustained postures     10 min Therapeutic Activity:  [x]? See flow sheet :squatting, reaching activities. Rationale: increase ROM, increase strength, improve coordination, improve balance and increase proprioception  to improve the patients ability to perform ADLs with less pain.                With   [] TE   [] TA   [] neuro   [] other: Patient Education: [x] Review HEP    [] Progressed/Changed HEP based on:   [] positioning   [] body mechanics   [] transfers   [] heat/ice application    [] other:      Other Objective/Functional Measures:      Pain Level (0-10 scale) post treatment: 4    ASSESSMENT/Changes in Function: progressed core and shoulder stability exercises with quadruped and planks to help with stabilization with more advanced activities. Did well with weight progression during shoulder strengthening. Patient will continue to benefit from skilled PT services to modify and progress therapeutic interventions, address functional mobility deficits, address ROM deficits, address strength deficits, analyze and address soft tissue restrictions, analyze and cue movement patterns, analyze and modify body mechanics/ergonomics, assess and modify postural abnormalities, address imbalance/dizziness and instruct in home and community integration to attain remaining goals.      []  See Plan of Care  []  See progress note/recertification  []  See Discharge Summary         Progress towards goals / Updated goals:  Short Term Goals: To be accomplished in 2 weeks:  1.  Pt will report compliance with HEP in order to supplement PT treatment              Eval = established              met  2.  Pt will demonstrate left shoulder IR to T10 in order to improve ability to don undergarments              Eval = PSIS      Long Term Goals: To be accomplished in 9 treatments:  1.   Pt will score at least 47 on FOTO in order to improve overall function, decrease pain, and facilitate return to PLOF.             Eval = 28    Reassess at end of POC  2.   Pt will demonstrate bilateral hip extension strength 5-/5 in order to increased ambulation distances in the community              Eval = bilaterally 4/5  3.   Pt will demonstrate left shoulder strength at least 5-/5 in order to improve ability to lift objects overhead at home              Eval = 4/5 into all planes  4.   Pt will demonstrate right  strength at least 43lbs in order to decrease frequency of dropping dishes at home              Eval = 35lbs.      PLAN  [x]  Upgrade activities as tolerated     []  Continue plan of care  []  Update interventions per flow sheet       []  Discharge due to:_  []  Other:_      Kristan Solorzano, PT 7/23/2020  11:07 AM    Future Appointments   Date Time Provider Trisha Bundy   7/30/2020 11:00 AM Roseanna Nyhan, PT [facility]   7/31/2020  8:00 AM Eze Morales, PT [facility]   8/3/2020 11:00 AM Elke Irby, PT [facility]   8/6/2020 11:00 AM Roseanna Nyhan, PT [facility]   8/7/2020  2:00 PM Domenica Lopez MD [facility]   8/10/2020  9:30 AM Nic Richmond  [address]    8/10/2020 11:00 AM Elke Irby PT [facility]   8/11/2020  4:30 PM [facility] [address] [facility]

## 2020-07-27 ENCOUNTER — APPOINTMENT (OUTPATIENT)
Dept: PHYSICAL THERAPY | Age: 45
End: 2020-07-27
Payer: OTHER GOVERNMENT

## 2020-07-30 ENCOUNTER — APPOINTMENT (OUTPATIENT)
Dept: PHYSICAL THERAPY | Age: 45
End: 2020-07-30
Payer: OTHER GOVERNMENT

## 2020-07-31 ENCOUNTER — APPOINTMENT (OUTPATIENT)
Dept: PHYSICAL THERAPY | Age: 45
End: 2020-07-31
Payer: OTHER GOVERNMENT

## 2020-08-03 ENCOUNTER — APPOINTMENT (OUTPATIENT)
Dept: PHYSICAL THERAPY | Age: 45
End: 2020-08-03

## 2020-08-06 ENCOUNTER — APPOINTMENT (OUTPATIENT)
Dept: PHYSICAL THERAPY | Age: 45
End: 2020-08-06

## 2020-08-10 ENCOUNTER — VIRTUAL VISIT (OUTPATIENT)
Dept: ORTHOPEDIC SURGERY | Age: 45
End: 2020-08-10

## 2020-08-10 ENCOUNTER — APPOINTMENT (OUTPATIENT)
Dept: PHYSICAL THERAPY | Age: 45
End: 2020-08-10

## 2020-08-10 DIAGNOSIS — M53.3 SACROILIAC JOINT DYSFUNCTION OF RIGHT SIDE: ICD-10-CM

## 2020-08-10 DIAGNOSIS — R20.2 NUMBNESS AND TINGLING OF RIGHT ARM: Primary | ICD-10-CM

## 2020-08-10 DIAGNOSIS — G89.29 CHRONIC LEFT SHOULDER PAIN: ICD-10-CM

## 2020-08-10 DIAGNOSIS — M25.512 CHRONIC LEFT SHOULDER PAIN: ICD-10-CM

## 2020-08-10 DIAGNOSIS — M54.12 CERVICAL RADICULOPATHY: ICD-10-CM

## 2020-08-10 DIAGNOSIS — R20.0 NUMBNESS AND TINGLING OF RIGHT ARM: Primary | ICD-10-CM

## 2020-08-10 DIAGNOSIS — M54.50 LUMBAR SPINE PAIN: ICD-10-CM

## 2020-08-10 DIAGNOSIS — M54.2 NECK PAIN: ICD-10-CM

## 2020-08-10 DIAGNOSIS — M46.1 SACROILIITIS (HCC): ICD-10-CM

## 2020-08-10 DIAGNOSIS — G54.0 THORACIC OUTLET SYNDROME: ICD-10-CM

## 2020-08-10 NOTE — PATIENT INSTRUCTIONS
Electromyogram (EMG) and Nerve Conduction Studies: About These Tests What are they? An electromyogram (EMG) measures the electrical activity of your muscles when you are not using them (at rest) and when you tighten them (muscle contraction). Nerve conduction studies (NCS) measure how well and how fast the nerves can send electrical signals. EMG and nerve conduction studies are often done together. If they are done together, the nerve conduction studies are done before the EMG. Why are they done? You may need an EMG to find diseases that damage your muscles or nerves or to find why you can't move your muscles (paralysis), why they feel weak, or why they twitch. These problems may include a herniated disc, amyotrophic lateral sclerosis (ALS), or myasthenia gravis (MG). You may need nerve conduction studies to find damage to the nerves that lead from the brain and spinal cord to the rest of the body. (This is called the peripheral nervous system.) These studies are often used to help find nerve disorders, such as carpal tunnel syndrome. How do you prepare for these tests? · Wear loose-fitting clothing. You may be given a hospital gown to wear. · The electrodes for the test are attached to your skin. Your skin needs to be clean and free of sprays, oils, creams, and lotions. · You may be asked to sign a consent form that says you understand the risks of the test and agree to have it done. · Tell your doctor ALL the medicines, vitamins, supplements, and herbal remedies you take. Some may increase the risk of problems during your test. Your doctor will tell you if you should stop taking any of them before the test and how soon to do it. · If you take aspirin or some other blood thinner, ask your doctor if you should stop taking it before your test. Make sure that you understand exactly what your doctor wants you to do. These medicines increase the risk of bleeding. How are the tests done? You lie on a table or bed or sit in a reclining chair so your muscles are relaxed. For an EMG:  
· Your doctor will insert a needle electrode into a muscle. This will record the electrical activity while the muscle is at rest. 
· Your doctor will ask you to tighten the same muscle slowly and steadily while the electrical activity is recorded. · Your doctor may move the electrode to a different area of the muscle or a different muscle. For nerve conduction studies:  
· Your doctor will attach two types of electrodes to your skin. ? One type of electrode is placed over a nerve and will give the nerve an electrical pulse. ? The other type of electrode is placed over the muscle that the nerve controls. It will record how long it takes the muscle to react to the electrical pulse. How does having electromyogram (EMG) and nerve conduction studies feel? During an EMG test, you may feel a quick, sharp pain when the needle electrode is put into a muscle. With nerve conduction studies, you will be able to feel the electrical pulses. The tests make some people anxious. Keep in mind that only a very low-voltage electrical current is used. And each electrical pulse is very quick. It lasts less than a second. How long do they take? · An EMG may take 30 to 60 minutes. · Nerve conduction tests may take from 15 minutes to 1 hour or more. It depends on how many nerves and muscles your doctor tests. What happens after these tests? · After the test, you may be sore and feel a tingling in your muscles. This may last for up to 2 days. · If any of the test areas are sore: 
? Put ice or a cold pack on the area for 10 to 20 minutes at a time. Put a thin cloth between the ice and your skin. ? Take an over-the-counter pain medicine, such as acetaminophen (Tylenol), ibuprofen (Advil, Motrin), or naproxen (Aleve). Be safe with medicines. Read and follow all instructions on the label. · You will probably be able to go home right away. It depends on the reason for the test. 
· You can go back to your usual activities right away. When should you call for help? Watch closely for changes in your health, and be sure to contact your doctor if: · Muscle pain from an EMG test gets worse or you have swelling, tenderness, or pus at any of the needle sites. · You have any problems that you think may be from the test. 
· You have any questions about the test or have not received your results. Follow-up care is a key part of your treatment and safety. Be sure to make and go to all appointments, and call your doctor if you are having problems. It's also a good idea to keep a list of the medicines you take. Ask your doctor when you can expect to have your test results. Where can you learn more? Go to http://bran-kamryn.info/ Enter G538 in the search box to learn more about \"Electromyogram (EMG) and Nerve Conduction Studies: About These Tests. \" Current as of: November 20, 2019               Content Version: 12.5 © 3841-7935 [employer]. Care instructions adapted under license by [employer] (which disclaims liability or warranty for this information). If you have questions about a medical condition or this instruction, always ask your healthcare professional. [address] any warranty or liability for your use of this information.

## 2020-08-10 NOTE — PROGRESS NOTES
[address]  [address], [address]  [place], [address] [place] [address] Street  Phone: [phone]  Fax: [phone]        Yadira Smith  : 1975  PCP: Cris Blackwell NP  8/10/2020    PROGRESS NOTE    Consent: Lesta Boas is a 39 y.o. female who was seen by synchronous (real-time) audio-video technology, and/or her healthcare decision maker, is aware that this patient-initiated, Telehealth encounter on 8/10/2020 is a billable service, with coverage as determined by his/her insurance carrier. He/She is aware that he/she may receive a bill and has provided verbal consent to proceed: Yes. The visit was conducted in the office with the patient being in home through a Doxy platform. Visit video time start: 10:12 AM end: 10:23 AM      HISTORY OF PRESENT ILLNESS  Lesta Boas is a 39 y.o. female who was seen as a new patient 2020 with c/o neck pain radiating into the BUE that began after she fell when she was putting [place] decorations into the attic. In January, she began having limited ROM of both arms, and she has had difficulty putting on her bra, etc. She c/o numbness in the RUE from the elbow to the hand into all digits except the pinky. She wears a wrist brace on the right with no benefit. She c/o limited ROM of both shoulders (L>R) due to pain. Pt notes that she has been unable to drive because her RUE feels weak and numb. She also c/o low back pain radiating into the RLE into the bottom of the foot x 2 years. She has attended PT with some benefit. Lesta Boas is seen virtually for f/u. She has been attending PT (2020-2020; [facility]) with benefit - she notes that she feels stronger and that her posture has improved. However, she continues to have left shoulder pain and right hand paraesthesia. She is scheduled to see Dr. Dari Guerra for her shoulder evaluation 2020.  She had a Right SI joint injection (2020; Dr. Isaiah Martinez) with significant improvement, and she reports no back or SI joint pain. Pain Scale: 0/10    Treatments patient has tried:  Physical therapy: Yes  Doing HEP: Unknown  Non-opioid medications: Yes - Flexeril  Spinal injections: No  Spinal surgery- No.   Last Spine MRI: None.     PmHx: None. ASSESSMENT  This is a 39year-old female with neck pain with radiating numbness in the RUE and left shoulder pain following a fall out of the attic. Her RUE symptoms may be thoracic outlet syndrome vs peripheral entrapment, less likely a cervical radiculopathy. She also c/o low back pain radiating into the RLE. This pain is likely SI joint dysfunction on the R - she had 5/5 positive SI test on the R. PLAN  1. Proceed with RUE EMG to evaluate thoracic outlet vs peripheral entrapment. Pt will f/u for RUE EMG or sooner as needed. Diagnoses and all orders for this visit:    1. Numbness and tingling of right arm    2. Chronic left shoulder pain    3. Sacroiliitis ([address]    4. Sacroiliac joint dysfunction of right side    5. Thoracic outlet syndrome    6. Neck pain    7. Lumbar spine pain    8. Cervical radiculopathy           Follow-up and Dispositions    · Return for for EMG (30 minutes) . PAST MEDICAL HISTORY   Past Medical History:   Diagnosis Date    History of uterine fibroid        Past Surgical History:   Procedure Laterality Date    HX  SECTION      x2   . MEDICATIONS      Current Outpatient Medications   Medication Sig Dispense Refill    cyclobenzaprine (FLEXERIL) 10 mg tablet Take 10 mg by mouth two (2) times a day.           ALLERGIES    Allergies   Allergen Reactions    Latex Rash          SOCIAL HISTORY    Social History     Socioeconomic History    Marital status:      Spouse name: Not on file    Number of children: Not on file    Years of education: Not on file    Highest education level: Not on file   Tobacco Use    Smoking status: Never Smoker    Smokeless tobacco: Never Used Substance and Sexual Activity    Alcohol use: Yes     Comment: occassionally       FAMILY HISTORY  History reviewed. No pertinent family history. REVIEW OF SYSTEMS  Review of Systems   Musculoskeletal: Positive for back pain and neck pain. RUE paraesthesia  Left shoulder pain  RLE paraesthesia         PHYSICAL EXAMINATION    Pain Assessment  8/10/2020   Location of Pain Back   Location Modifiers -   Severity of Pain 0   Quality of Pain -   Quality of Pain Comment -   Duration of Pain -   Frequency of Pain -   Limiting Behavior -   Result of Injury -           -Constitutional: Healthy appearing, well developed, alert, in no acute distress  - Eyes: Conjunctiva clear, lids unremarkable, sclera anicteric  - Ears, nose, mouth, and throat: External inspection head, face, ears and nose identifies normal appearance without obvious deformity.  -Neck: No asymmetry, no masses, trachea is midline, and normal overall appearance.  -Respiratory: Respirations are even and unlabored. -Musculoskeletal: No cyanosis, clubbing, or edema observed    -Musculoskeletal: Inspection of the following identifies no gross deformity, misalignment, or asymmetry:   -Head/neck   -Spine   -Ribs/pelvis   -Right upper extremity    -Left upper extremity     -Musculoskeletal: Assessment of range of motion of the following identifies no gross limitations:    -Head/neck   -Spine   -Ribs/pelvis   -Right upper extremity    -Left upper extremity     -Skin: Head and neck skin with no lesions or rash  -Neurologic: Cranial nerves 3-12 grossly intact. -Psychiatric: Judgement and insight intact. The limitations of a telemedicine visit including the inability to perform a detailed physical examination may miss significant findings was presented to the patient, and the patient still elected to proceed with the telemedicine visit.     RADIOGRAPHS/DATA  Cervical Spine XR images taken on 2/12/2020 personally reviewed with patient:  VERTEBRAE AND ALIGNMENT: Segments are normal in height and alignment. DISC SPACES: Little or no significant disc space narrowing. FACET JOINTS AND FORAMINA: No significant facet DJD or bony foraminal stenosis. PARASPINOUS SOFT TISSUES: Unremarkable. ADDITIONAL: None.    _______________    IMPRESSION    No significant abnormality.         reviewed    Ms. Sandro Macedo has a reminder for a \"due or due soon\" health maintenance. I have asked that she contact her primary care provider for follow-up on this health maintenance. Pursuant to the emergency declaration under the [address] [place] [address], [facility] waiver authority and the [employer] Act, this Virtual  Visit was conducted, with patient's consent, to reduce the patient's risk of exposure to COVID-19 and provide continuity of care for an established patient. Services were provided through a video synchronous discussion virtually to substitute for in-person clinic visit. CPT Codes 27168-68454 for Established Patients may apply to this Telehealth Visit  Time-based coding, delete if not needed: I spent at least 10 minutes with this established patient, and >50% of the time was spent counseling and/or coordinating care. Written by Rajan Barnett, as dictated by Dr. Becka Davenport. I, Dr. Becka Davenport, confirm that all documentation is accurate.

## 2020-09-04 ENCOUNTER — OFFICE VISIT (OUTPATIENT)
Dept: ORTHOPEDIC SURGERY | Facility: CLINIC | Age: 45
End: 2020-09-04

## 2020-09-04 VITALS
TEMPERATURE: 97 F | DIASTOLIC BLOOD PRESSURE: 58 MMHG | RESPIRATION RATE: 15 BRPM | WEIGHT: 154.9 LBS | HEIGHT: 62 IN | BODY MASS INDEX: 28.5 KG/M2 | OXYGEN SATURATION: 98 % | HEART RATE: 80 BPM | SYSTOLIC BLOOD PRESSURE: 103 MMHG

## 2020-09-04 DIAGNOSIS — M75.02 ADHESIVE CAPSULITIS OF LEFT SHOULDER: Primary | ICD-10-CM

## 2020-09-04 NOTE — PROGRESS NOTES
Patient: Lesta Boas                MRN: [id number]       SSN: [id number]  YOB: 1975        AGE: 39 y.o. SEX: female  Body mass index is 28.33 kg/m². PCP: Cris Blackwell NP  20    CHIEF COMPLAINT: Left shoulder pain/stiffness    HPI: Lesta Boas is a 39 y.o. female patient who presents to the office today with left shoulder pain and stiffness. She has had this for about 7 months. She had a trauma to her shoulder where she fell through her attic. She grabbed herself with her left arm to keep her from falling. She did not report a pop or pain but several months later she is reported stiffness and pain. She has difficulty with raising her arm upper back as well as reaching forward. She is been in therapy which is helped some. She is not had an MRI. Past Medical History:   Diagnosis Date    History of uterine fibroid        No family history on file. Current Outpatient Medications   Medication Sig Dispense Refill    cyclobenzaprine (FLEXERIL) 10 mg tablet Take 10 mg by mouth two (2) times a day.          Allergies   Allergen Reactions    Latex Rash       Past Surgical History:   Procedure Laterality Date    HX  SECTION      x2       Social History     Socioeconomic History    Marital status:      Spouse name: Not on file    Number of children: Not on file    Years of education: Not on file    Highest education level: Not on file   Occupational History    Not on file   Social Needs    Financial resource strain: Not on file    Food insecurity     Worry: Not on file     Inability: Not on file    Transportation needs     Medical: Not on file     Non-medical: Not on file   Tobacco Use    Smoking status: Never Smoker    Smokeless tobacco: Never Used   Substance and Sexual Activity    Alcohol use: Yes     Comment: occassionally    Drug use: Not on file    Sexual activity: Not on file   Lifestyle    Physical activity     Days per week: Not on file     Minutes per session: Not on file    Stress: Not on file   Relationships    Social connections     Talks on phone: Not on file     Gets together: Not on file     Attends Yazidi service: Not on file     Active member of club or organization: Not on file     Attends meetings of clubs or organizations: Not on file     Relationship status: Not on file    Intimate partner violence     Fear of current or ex partner: Not on file     Emotionally abused: Not on file     Physically abused: Not on file     Forced sexual activity: Not on file   Other Topics Concern    Not on file   Social History Narrative    Not on file       REVIEW OF SYSTEMS:      CON: negative for recent weight loss/gain, fever, or chills  EYE: negative for double or blurry vision  ENT: negative for hoarseness  RS:   negative for cough, URI, SOB  CV:  negative for chest pain, palpitations  GI:    negative for blood in stool, nausea/vomiting  :  negative for blood in urine  MS: As per HPI  Other systems reviewed and noted below. PHYSICAL EXAMINATION:  Visit Vitals  /58 (BP 1 Location: Left arm, BP Patient Position: Sitting)   Pulse 80   Temp 97 °F (36.1 °C) (Oral)   Resp 15   Ht 5' 2\" (1.575 m)   Wt 154 lb 14.4 oz (70.3 kg)   SpO2 98%   BMI 28.33 kg/m²     Body mass index is 28.33 kg/m². GENERAL: Alert and oriented x3, in no acute distress, well-developed, well-nourished. HEENT: Normocephalic, atraumatic. RESP: Non labored breathing with equal chest rise on inspiration. CV: Well perfused extremities. No cyanosis or clubbing noted. ABDOMEN: Soft, non-tender, non-distended.    Shoulder Examination     R   L  ROM   FF  Full   140  ER  Full   40   IR  Full   Hip  Rotator Cuff Pain   Supra  -   +   Infra  -   -   Subscap -   -  Crepitus  -   -  Effusion  -   -  Warmth  -   -   Erythema  -   -  Instability  -   -  AC Joint TTP  -   -  Clavicle   Deformity -   -   TTP  -   -  Proximal Humerus   Deformity -   -   TTP  -   -  Deltoid Strength 5   5  Biceps Strength 5   5  Biceps Deformity -   -  Biceps Groove Pain -   -  Impingement Sign -   -       IMAGING:  X-rays of the left shoulder at the time of the injury were reviewed which are normal    ASSESSMENT & PLAN  Diagnosis: Left frozen shoulder    Aaron Gannon has a traumatic left frozen shoulder. She does have some pain with rotator cuff strength testing in the supraspinatus. I recommend an MRI to further evaluate the soft tissues and determine if we are just working with a frozen shoulder. I will see her back after that is done.       Electronically signed by: Cata Raphael MD

## 2020-09-15 ENCOUNTER — HOSPITAL ENCOUNTER (OUTPATIENT)
Dept: MRI IMAGING | Age: 45
Discharge: HOME | End: 2020-09-15
Attending: ORTHOPAEDIC SURGERY
Payer: OTHER GOVERNMENT

## 2020-09-15 DIAGNOSIS — M75.02 ADHESIVE CAPSULITIS OF LEFT SHOULDER: ICD-10-CM

## 2020-09-15 PROCEDURE — 73221 MRI JOINT UPR EXTREM W/O DYE: CPT

## 2020-09-22 NOTE — PROGRESS NOTES
[facility] [address]  [address] [facility] [address], [address] [phone] fax    Discharge Summary  Patient name: Melecio Fischer Start of Care: 2020   Referral source: Marita Palmer MD : 1975                Medical Diagnosis: Pain in left shoulder [M25.512]  Cervicalgia [M54.2]  Low back pain [M54.5]  Numbness and tingling of right arm [R20.0, R20.2]  Payor:  / Plan: [employer] / Product Type: [employer] /  Onset Date:chronic with exacerbation 2019                Treatment Diagnosis: LBP, left shoulder pain, right carpal tunnel    Prior Hospitalization: see medical history Provider#: [id number]   Medications: Verified on Patient summary List    Comorbidities: history of LBP   Prior Level of Function: functionally (I)    Visits from Start of Care: 4    Missed Visits: 0    Reporting Period : 20 to 20    Short Term Goals: To be accomplished in 2 weeks:  1.  Pt will report compliance with HEP in order to supplement PT treatment              Eval = established              met  2.  Pt will demonstrate left shoulder IR to T10 in order to improve ability to don undergarments              Eval = PSIS    Unable to reassess     Long Term Goals: To be accomplished in 9 treatments:  1.   Pt will score at least 47 on FOTO in order to improve overall function, decrease pain, and facilitate return to PLOF.               Eval = 28    Unable to reassess  2.   Pt will demonstrate bilateral hip extension strength 5-/5 in order to increased ambulation distances in the community              Eval = bilaterally 4/5   Unable to reassess  3.   Pt will demonstrate left shoulder strength at least 5-/5 in order to improve ability to lift objects overhead at home              Eval = 4/5 into all planes   Unable to reassess  4.   Pt will demonstrate right  strength at least 43lbs in order to decrease frequency of dropping dishes at home              Eval = 35lbs.    Unable to reassess    Assessment/Summary of care: Ms. Sandro Macedo was evaluated on 7/13/20 for LBP, right carpal tunnel, and left shoulder pain , and seen for a total of 4 visits. Patient was last seen in our office 7/23/20 and has not since f/u to schedule additional visits. As patient status is currently unknown, we will discontinue skilled PT services at this time.  Should patient require additional PT in the future, we would be happy to continue treatment with updated order from MD.      RECOMMENDATIONS:  [x]Discontinue therapy: []Patient has reached or is progressing toward set goals      [x]Patient is non-compliant or has abdicated      []Due to lack of appreciable progress towards set goals    Jaya Vazquez, PT 9/22/2020 1:31 PM

## 2020-10-07 ENCOUNTER — HOSPITAL ENCOUNTER (OUTPATIENT)
Dept: MAMMOGRAPHY | Age: 45
Discharge: HOME | End: 2020-10-07
Payer: OTHER GOVERNMENT

## 2020-10-07 DIAGNOSIS — Z12.31 VISIT FOR SCREENING MAMMOGRAM: ICD-10-CM

## 2020-10-07 PROCEDURE — 77063 BREAST TOMOSYNTHESIS BI: CPT

## 2020-10-09 ENCOUNTER — OFFICE VISIT (OUTPATIENT)
Dept: ORTHOPEDIC SURGERY | Age: 45
End: 2020-10-09
Payer: OTHER GOVERNMENT

## 2020-10-09 VITALS
DIASTOLIC BLOOD PRESSURE: 78 MMHG | RESPIRATION RATE: 14 BRPM | HEART RATE: 63 BPM | HEIGHT: 62 IN | WEIGHT: 158 LBS | TEMPERATURE: 97.3 F | OXYGEN SATURATION: 99 % | SYSTOLIC BLOOD PRESSURE: 123 MMHG | BODY MASS INDEX: 29.08 KG/M2

## 2020-10-09 DIAGNOSIS — S46.012A TRAUMATIC INCOMPLETE TEAR OF LEFT ROTATOR CUFF, INITIAL ENCOUNTER: Primary | ICD-10-CM

## 2020-10-09 PROCEDURE — 99214 OFFICE O/P EST MOD 30 MIN: CPT | Performed by: ORTHOPAEDIC SURGERY

## 2020-10-09 NOTE — PROGRESS NOTES
Patient: Jose Ramon Bartlett                MRN: [id number]       SSN: [id number]  YOB: 1975        AGE: 39 y.o. SEX: female  Body mass index is 28.9 kg/m². PCP: Doe Aguirre NP  10/09/20    Chief Complaint: Left shoulder follow up    HPI: Jose Ramon Bartlett is a 39 y.o. female patient who returns to the office today for her left shoulder. She had her MRI done. She continues to have shoulder pain and some stiffness. Past Medical History:   Diagnosis Date    History of uterine fibroid        No family history on file. Current Outpatient Medications   Medication Sig Dispense Refill    cyclobenzaprine (FLEXERIL) 10 mg tablet Take 10 mg by mouth two (2) times a day.          Allergies   Allergen Reactions    Latex Rash       Past Surgical History:   Procedure Laterality Date    HX  SECTION      x2       Social History     Socioeconomic History    Marital status:      Spouse name: Not on file    Number of children: Not on file    Years of education: Not on file    Highest education level: Not on file   Occupational History    Not on file   Social Needs    Financial resource strain: Not on file    Food insecurity     Worry: Not on file     Inability: Not on file    Transportation needs     Medical: Not on file     Non-medical: Not on file   Tobacco Use    Smoking status: Never Smoker    Smokeless tobacco: Never Used   Substance and Sexual Activity    Alcohol use: Yes     Comment: occassionally    Drug use: Not on file    Sexual activity: Not on file   Lifestyle    Physical activity     Days per week: Not on file     Minutes per session: Not on file    Stress: Not on file   Relationships    Social connections     Talks on phone: Not on file     Gets together: Not on file     Attends Shinto service: Not on file     Active member of club or organization: Not on file     Attends meetings of clubs or organizations: Not on file     Relationship status: Not on file    Intimate partner violence     Fear of current or ex partner: Not on file     Emotionally abused: Not on file     Physically abused: Not on file     Forced sexual activity: Not on file   Other Topics Concern    Not on file   Social History Narrative    Not on file       REVIEW OF SYSTEMS:      No changes from previous review of systems unless noted. PHYSICAL EXAMINATION:  Visit Vitals  /78 (BP 1 Location: Left arm)   Pulse 63   Temp 97.3 °F (36.3 °C) (Temporal)   Resp 14   Ht 5' 2\" (1.575 m)   Wt 158 lb (71.7 kg)   SpO2 99%   BMI 28.90 kg/m²     Body mass index is 28.9 kg/m². GENERAL: Alert and oriented x3, in no acute distress. HEENT: Normocephalic, atraumatic. RESP: Non labored breathing. SKIN: No rashes or lesions noted. Shoulder Examination     R   L  ROM   FF  Full   150  ER  Full   40   IR  Full   LS  Rotator Cuff Pain   Supra  -   +   Infra  -   -   Subscap -   -  Crepitus  -   -  Effusion  -   -  Warmth  -   -   Erythema  -   -  Instability  -   -  AC Joint TTP  -   -  Clavicle   Deformity -   -   TTP  -   -  Proximal Humerus   Deformity -   -   TTP  -   -  Deltoid Strength 5   5  Biceps Strength 5   5  Biceps Deformity -   -  Biceps Groove Pain -   -  Impingement Sign -   -       IMAGING:  An MRI of the left shoulder was reviewed in the office today. This shows partial-thickness tearing of the supraspinatus but no full-thickness tearing is identified of any of the rotator cuff tendons. ASSESSMENT & PLAN  Diagnosis: Left shoulder partial rotator cuff injury    Mil Schmidt has some residual stiffness in the left shoulder pain with a partial-thickness MRI injury. I recommended physical therapy for this. Hopefully we can recover without any need for surgery. I will plan to see her back in 6 weeks.       Electronically signed by: Miki Nissen, MD

## 2020-10-14 ENCOUNTER — OFFICE VISIT (OUTPATIENT)
Dept: ORTHOPEDIC SURGERY | Age: 45
End: 2020-10-14
Payer: OTHER GOVERNMENT

## 2020-10-14 VITALS
HEART RATE: 83 BPM | WEIGHT: 156 LBS | RESPIRATION RATE: 16 BRPM | OXYGEN SATURATION: 98 % | DIASTOLIC BLOOD PRESSURE: 77 MMHG | SYSTOLIC BLOOD PRESSURE: 126 MMHG | HEIGHT: 62 IN | BODY MASS INDEX: 28.71 KG/M2 | TEMPERATURE: 97.7 F

## 2020-10-14 DIAGNOSIS — R94.131 ABNORMAL EMG: ICD-10-CM

## 2020-10-14 DIAGNOSIS — R20.0 NUMBNESS AND TINGLING OF RIGHT ARM: ICD-10-CM

## 2020-10-14 DIAGNOSIS — G56.01 CARPAL TUNNEL SYNDROME OF RIGHT WRIST: ICD-10-CM

## 2020-10-14 DIAGNOSIS — R20.0 NUMBNESS AND TINGLING OF RIGHT ARM: Primary | ICD-10-CM

## 2020-10-14 DIAGNOSIS — R20.2 NUMBNESS AND TINGLING OF RIGHT ARM: ICD-10-CM

## 2020-10-14 DIAGNOSIS — R20.2 NUMBNESS AND TINGLING OF RIGHT ARM: Primary | ICD-10-CM

## 2020-10-14 PROCEDURE — 95909 NRV CNDJ TST 5-6 STUDIES: CPT | Performed by: PHYSICAL MEDICINE & REHABILITATION

## 2020-10-14 PROCEDURE — 95886 MUSC TEST DONE W/N TEST COMP: CPT | Performed by: PHYSICAL MEDICINE & REHABILITATION

## 2020-10-14 PROCEDURE — 99213 OFFICE O/P EST LOW 20 MIN: CPT | Performed by: PHYSICAL MEDICINE & REHABILITATION

## 2020-10-14 NOTE — PROGRESS NOTES
[address]  [address], [address]  [facility], [address] [place] [address] Street  Phone: [phone]  Fax: [phone]        Meda Osler  : 1975  PCP: Jacqueline Fox NP  10/14/2020    ELECTROMYOGRAPHY AND NERVE CONDUCTION STUDIES    Allie Mayen was referred by Dr. Radha Lares for electrodiagnostic evaluation of RUE paraesthesia    NCV & EMG Findings:  Evaluation of the right median motor nerve showed prolonged distal onset latency (6.2 ms). The right median sensory nerve showed prolonged distal peak latency (5.4 ms), reduced amplitude (8.4 µV), and decreased conduction velocity (Wrist-2nd Digit, 26 m/s). All remaining nerves (as indicated in the following tables) were within normal limits. All examined muscles (as indicated in the following table) showed no evidence of electrical instability. INTERPRETATION    This is an abnormal electrodiagnostic examination. These findings may be consistent with:   1. Moderate median mononeuropathy at the right wrist (carpal tunnel syndrome)    There is no electrodiagnostic evidence of any cervical radiculopathy, brachial plexopathy, peripheral polyneuropathy, or any other mononeuropathy. CLINICAL INTERPRETATION  Her electrodiagnostic findings of right carpal tunnel syndrome are consistent with her right hand symptoms. PLAN  1. Referral to Dr. Savage Kaye for carpal tunnel syndrome    Pt will f/u PRN. HISTORY OF PRESENT ILLNESS  Allie Mayen is a 39 y.o. female. Pt presents today for RUE EMG evaluation of RUE paraesthesia into all digits with the exception of the pinky. Her symptoms began after she fell out of her attic. She has difficulty cooking and driving due to her hand symptoms. Pt notes that she seen significant improvement of her back pain. She continues to see Dr. Catracho Moses for her left shoulder pain.     PAST MEDICAL HISTORY   Past Medical History:   Diagnosis Date    History of uterine fibroid        Past Surgical History:   Procedure Laterality Date    HX  SECTION      x2   . MEDICATIONS    Current Outpatient Medications   Medication Sig Dispense Refill    cyclobenzaprine (FLEXERIL) 10 mg tablet Take 10 mg by mouth two (2) times a day. ALLERGIES  Allergies   Allergen Reactions    Latex Rash          SOCIAL HISTORY    Social History     Socioeconomic History    Marital status:      Spouse name: Not on file    Number of children: Not on file    Years of education: Not on file    Highest education level: Not on file   Tobacco Use    Smoking status: Never Smoker    Smokeless tobacco: Never Used   Substance and Sexual Activity    Alcohol use: Yes     Comment: occassionally       FAMILY HISTORY  History reviewed. No pertinent family history. PHYSICAL EXAMINATION  Visit Vitals  /77 (BP 1 Location: Right arm, BP Patient Position: Sitting)   Pulse 83   Temp 97.7 °F (36.5 °C) (Skin)   Resp 16   Ht 5' 2\" (1.575 m)   Wt 156 lb (70.8 kg)   LMP  (LMP Unknown)   SpO2 98%   BMI 28.53 kg/m²       Pain Assessment  10/9/2020   Location of Pain Shoulder   Location Modifiers Left   Severity of Pain 6   Quality of Pain Dull; Sharp   Quality of Pain Comment -   Duration of Pain Persistent   Frequency of Pain Constant   Aggravating Factors Other (Comment)   Aggravating Factors Comment moving arm in certain way   Limiting Behavior No   Relieving Factors Other (Comment)   Relieving Factors Comment massage   Result of Injury Yes   Work-Related Injury No   Type of Injury Fall           Constitutional:  Well developed, well nourished, in no acute distress. Psychiatric: Affect and mood are appropriate. Integumentary: No rashes or abrasions noted on exposed areas. SPINE/MUSCULOSKELETAL EXAM    On brief examination: None.       NCV & EMG Findings:  Nerve Conduction Studies  Anti Sensory Summary Table     Stim Site NR Peak (ms) Norm Peak (ms) O-P Amp (µV) Norm O-P Amp Site1 Site2 Delta-P (ms) Dist (cm) Deo (m/s) Norm Deo (m/s)   Right Median Anti Sensory (2nd Digit)   Wrist    5.4 <3.6 8.4 >10 Wrist 2nd Digit 5.4 14.0 26 >39   Right Radial Anti Sensory (Base 1st Digit)   Wrist    1.9 <3.1 78.3  Wrist Base 1st Digit 1.9 0.0     Right Ulnar Anti Sensory (5th Digit)   Wrist    3.1 <3.7 60.0 >15.0 Wrist 5th Digit 3.1 14.0 45 >38     Motor Summary Table     Stim Site NR Onset (ms) Norm Onset (ms) O-P Amp (mV) Norm O-P Amp Site1 Site2 Delta-0 (ms) Dist (cm) Deo (m/s) Norm Deo (m/s)   Right Median Motor (Abd Poll Brev)   Wrist    6.2 <4.2 9.1 >5 Elbow Wrist 3.5 20.0 57 >50   Elbow    9.7  8.2          Right Ulnar Motor (Abd Dig Min)   Wrist    2.7 <4.2 12.9 >3 B Elbow Wrist 2.8 17.0 61 >53   B Elbow    5.5  11.6  A Elbow B Elbow 1.8 10.0 56 >53   A Elbow    7.3  12.1            EMG     Side Muscle Nerve Root Ins Act Fibs Psw Amp Dur Poly Recrt Int Milli Challenger Comment   Right Biceps Musculocut C5-6 Nml Nml Nml Nml Nml 0 Nml Nml    Right Triceps Radial C6-7-8 Nml Nml Nml Nml Nml 0 Nml Nml    Right PronatorTeres Median C6-7 Nml Nml Nml Nml Nml 0 Nml Nml    Right Abd Poll Brev Median C8-T1 Nml Nml Nml Nml Nml 0 Nml Nml    Right 1stDorInt Ulnar C8-T1 Nml Nml Nml Nml Nml 0 Nml Nml        Nerve Conduction Studies  Anti Sensory Left/Right Comparison     Stim Site L Lat (ms) R Lat (ms) L-R Lat (ms) L Amp (µV) R Amp (µV) L-R Amp (%) Site1 Site2 L Deo (m/s) R Deo (m/s) L-R Deo (m/s)   Median Anti Sensory (2nd Digit)   Wrist  5.4   8.4  Wrist 2nd Digit  26    Radial Anti Sensory (Base 1st Digit)   Wrist  1.9   78.3  Wrist Base 1st Digit      Ulnar Anti Sensory (5th Digit)   Wrist  3.1   60.0  Wrist 5th Digit  45      Motor Left/Right Comparison     Stim Site L Lat (ms) R Lat (ms) L-R Lat (ms) L Amp (mV) R Amp (mV) L-R Amp (%) Site1 Site2 L Deo (m/s) R Deo (m/s) L-R Deo (m/s)   Median Motor (Abd Poll Brev)   Wrist  6.2   9.1  Elbow Wrist  57    Elbow  9.7   8.2         Ulnar Motor (Abd Dig Min)   Wrist  2.7   12.9  B Elbow Wrist  61    B Elbow  5.5   11.6  A Elbow B Elbow  56    A Elbow  7.3   12.1               Waveforms:                     [facility]  OFFICE PROCEDURE PROGRESS NOTE        Chart reviewed for the following:   Harika LOPEZ, have reviewed the History, Physical and updated the Allergic reactions for Pecola Epley     TIME OUT performed immediately prior to start of procedure:   Harika LOPEZ, have performed the following reviews on Pecola Epley prior to the start of the procedure:            * Patient was identified by name and date of birth   * Agreement on procedure being performed was verified  * Risks and Benefits explained to the patient  * Procedure site verified and marked as necessary  * Patient was positioned for comfort  * Consent was signed and verified     Time: 2:54 PM    Date of procedure: 10/14/2020    Procedure performed by:  Garth Ferrell MD    Provider accompanied by: [place]. Patient accompanied by: Self.     How tolerated by patient: tolerated the procedure well with no complications    Post Procedural Pain Scale: 0 - No Hurt    Comments: none    Written by Angelina Breen, [address] as dictated by Harika Sagastume MD

## 2020-10-14 NOTE — PATIENT INSTRUCTIONS
Carpal Tunnel Syndrome: Care Instructions  Overview     Carpal tunnel syndrome is numbness, tingling, weakness, and pain in your hand, wrist, and sometimes forearm. It is caused by pressure on the median nerve. This nerve and several tough tissues called tendons run through a space in the wrist. This space is called the carpal tunnel. The repeated hand motions used in work and some hobbies and sports can put pressure on the median nerve. Pregnancy can cause carpal tunnel syndrome. Several conditions, such as diabetes, arthritis, and an underactive thyroid, can also cause it. You may be able to limit an activity or change the way you do it to reduce your symptoms. You also can take other steps to feel better. If your symptoms are mild, 1 to 2 weeks of home treatment are likely to ease your pain. Surgery is needed only if other treatments do not work. Follow-up care is a key part of your treatment and safety. Be sure to make and go to all appointments, and call your doctor if you are having problems. It's also a good idea to know your test results and keep a list of the medicines you take. How can you care for yourself at home? · If possible, stop or reduce the activity that causes your symptoms. If you cannot stop the activity, take frequent breaks to rest and stretch or change hand positions to do a task. Try switching hands, such as when using a computer mouse. · Try to avoid bending or twisting your wrists. · Ask your doctor if you can take an over-the-counter pain medicine, such as acetaminophen (Tylenol), ibuprofen (Advil, Motrin), or naproxen (Aleve). Be safe with medicines. Read and follow all instructions on the label. · If your doctor prescribes corticosteroid medicine to help reduce pain and swelling, take it exactly as prescribed. Call your doctor if you think you are having a problem with your medicine. · Put ice or a cold pack on your wrist for 10 to 20 minutes at a time to ease pain.  Put a thin cloth between the ice and your skin. · If your doctor or your physical or occupational therapist tells you to wear a wrist splint, wear it as directed to keep your wrist in a neutral position. This also eases pressure on your median nerve. · Ask your doctor whether you should have physical or occupational therapy to learn how to do tasks differently. · Try a yoga class to stretch your muscles and build strength in your hands and wrists. Yoga has been shown to ease carpal tunnel symptoms. To prevent carpal tunnel  · When working at a computer, keep your hands and wrists in line with your forearms. Hold your elbows close to your sides. Take a break every 10 to 15 minutes. · Try these exercises:  ? Warm up: Rotate your wrist up, down, and from side to side. Repeat this 4 times. Stretch your fingers far apart, relax them, then stretch them again. Repeat 4 times. Stretch your thumb by pulling it back gently, holding it, and then releasing it. Repeat 4 times. ? Prayer stretch: Start with your palms together in front of your chest just below your chin. Slowly lower your hands toward your waistline while keeping your hands close to your stomach and your palms together until you feel a mild to moderate stretch under your forearms. Hold for 10 to 20 seconds. Repeat 4 times. ? Wrist flexor stretch: Hold your arm in front of you with your palm up. Bend your wrist, pointing your hand toward the floor. With your other hand, gently bend your wrist further until you feel a mild to moderate stretch in your forearm. Hold for 10 to 20 seconds. Repeat 4 times. ? Wrist extensor stretch: Repeat the steps for the wrist flexor stretch, but begin with your extended hand palm down. · Squeeze a rubber exercise ball several times a day to keep your hands and fingers strong. · Avoid holding objects (such as a book) in one position for a long time. When possible, use your whole hand to grasp an object.  Using just the thumb and index finger can put stress on the wrist.  · Do not smoke. It can make this condition worse by reducing blood flow to the median nerve. If you need help quitting, talk to your doctor about stop-smoking programs and medicines. These can increase your chances of quitting for good. When should you call for help? Watch closely for changes in your health, and be sure to contact your doctor if:    · Your pain or other problems do not get better with home care.     · You want more information about physical or occupational therapy.     · You have side effects of your corticosteroid medicine, such as:  ? Weight gain. ? Mood changes. ? Trouble sleeping. ? Bruising easily.     · You have any other problems with your medicine. Where can you learn more? Go to http://www.gray.com/  Enter R432 in the search box to learn more about \"Carpal Tunnel Syndrome: Care Instructions. \"  Current as of: March 2, 2020               Content Version: 12.6  © 8406-7488 [employer]. Care instructions adapted under license by [employer] (which disclaims liability or warranty for this information). If you have questions about a medical condition or this instruction, always ask your healthcare professional. [place] [address] any warranty or liability for your use of this information. Carpal Tunnel Release: Before Your Surgery  What is carpal tunnel release? Carpal tunnel surgery reduces the pressure on a nerve in the wrist. Your doctor will cut a ligament that presses on the nerve. This lets the nerve pass freely through the tunnel without being squeezed. This is also called carpal tunnel release surgery. The surgery can be open or endoscopic. In open surgery, your doctor makes a small cut in the palm of your hand. This cut is called an incision.  In endoscopic surgery, your doctor makes one small incision in the wrist. Or you may have one small incision in the wrist and one in the palm. Your doctor puts a thin tube with a camera attached (endoscope) into the incision. Surgical tools are put in along with the endoscope. In both types of surgeries, the incisions are closed with stitches. The incisions leave scars that usually fade in time. You may be asleep during the surgery. Or you may be awake and have medicine to numb your hand and arm so you won't feel pain. After surgery, your wrist and hand pain should start to go away. It usually takes 3 to 4 months to recover and 1 year before your hand strength returns. How much hand strength returns is different for each person. You will go home the same day as the surgery. When you can go back to work depends on the type of work you do. Follow-up care is a key part of your treatment and safety. Be sure to make and go to all appointments, and call your doctor if you are having problems. It's also a good idea to know your test results and keep a list of the medicines you take. How do you prepare for surgery? Surgery can be stressful. This information will help you understand what you can expect. And it will help you safely prepare for surgery. Preparing for surgery    · Be sure you have someone to take you home. Anesthesia and pain medicine will make it unsafe for you to drive or get home on your own.     · Understand exactly what surgery is planned, along with the risks, benefits, and other options.     · Tell your doctor ALL the medicines, vitamins, supplements, and herbal remedies you take. Some may increase the risk of problems during your surgery. Your doctor will tell you if you should stop taking any of them before the surgery and how soon to do it.     · If you take aspirin or some other blood thinner, ask your doctor if you should stop taking it before your surgery. Make sure that you understand exactly what your doctor wants you to do.  These medicines increase the risk of bleeding.     · Make sure your doctor and the hospital have a copy of your advance directive. If you don't have one, you may want to prepare one. It lets others know your health care wishes. It's a good thing to have before any type of surgery or procedure. What happens on the day of surgery? · Follow the instructions exactly about when to stop eating and drinking. If you don't, your surgery may be canceled. If your doctor told you to take your medicines on the day of surgery, take them with only a sip of water.     · Take a bath or shower before you come in for your surgery. Do not apply lotions, perfumes, deodorants, or nail polish.     · Do not shave the surgical site yourself.     · Take off all jewelry and piercings. And take out contact lenses, if you wear them. At the hospital or surgery center   · Bring a picture ID.     · The area for surgery is often marked to make sure there are no errors.     · You will be kept comfortable and safe by your anesthesia provider. The anesthesia may make you sleep. Or it may just numb the area being worked on.     · The surgery will take about 15 to 60 minutes. When should you call your doctor? · You have questions or concerns.     · You don't understand how to prepare for your surgery.     · You become ill before the surgery (such as fever, flu, or a cold).     · You need to reschedule or have changed your mind about having the surgery. Where can you learn more? Go to http://www.gray.com/  Enter Gearline Pointer in the search box to learn more about \"Carpal Tunnel Release: Before Your Surgery. \"  Current as of: March 2, 2020               Content Version: 12.6  © 5015-9854 [employer]. Care instructions adapted under license by [employer] (which disclaims liability or warranty for this information).  If you have questions about a medical condition or this instruction, always ask your healthcare professional. Basilia Resendez disclaims any warranty or liability for your use of this information.

## 2020-10-22 ENCOUNTER — HOSPITAL ENCOUNTER (OUTPATIENT)
Dept: PHYSICAL THERAPY | Age: 45
Discharge: HOME | End: 2020-10-22
Payer: OTHER GOVERNMENT

## 2020-10-22 PROCEDURE — 97110 THERAPEUTIC EXERCISES: CPT | Performed by: PHYSICAL THERAPIST

## 2020-10-22 PROCEDURE — 97112 NEUROMUSCULAR REEDUCATION: CPT | Performed by: PHYSICAL THERAPIST

## 2020-10-22 PROCEDURE — 97161 PT EVAL LOW COMPLEX 20 MIN: CPT | Performed by: PHYSICAL THERAPIST

## 2020-10-22 PROCEDURE — 97530 THERAPEUTIC ACTIVITIES: CPT | Performed by: PHYSICAL THERAPIST

## 2020-10-22 NOTE — PROGRESS NOTES
[facility]  [address] [facility], [address] [place] [address], [address] [place], [place] [address]y [address],[place] [address]  [phone] fax      Plan of Care/ Statement of Necessity for Physical Therapy Services    Patient name: Arcelia Salazar Start of Care: 10/22/2020   Referral source: Mari Azul NP : 1975    Medical Diagnosis: Pain in left shoulder [M25.512]  Payor:  / Plan: Eduin Aguirre 74 / Product Type:  /  Onset Date:2020    Treatment Diagnosis: L shoulder pain   Prior Hospitalization: see medical history Provider#: [id number]   Medications: Verified on Patient summary List    Comorbidities: Patient reports; back pain, incontinence, sleep dysfunction, depression. Prior Level of Function: I with ADLs, weightlifting and kickboxing for exercise. The Plan of Care and following information is based on the information from the initial evaluation. Assessment/ key information: Patient is a pleasant middle aged adult female with sub-acute onset of L shoulder pain progressively worsening over 11 months. Pain appears to be primarily peripheral neuropathic in nature with involvement of subscapularis and supraspinatus, likely partial tears consistent with MRI. Treatment will follow script with graded activity exposure as well as pain science education. Yellow flags on board in the form of history of depression, low self-efficacy, fear of movement, and high fear avoidance belief factor.    Evaluation Complexity History MEDIUM  Complexity : 1-2 comorbidities / personal factors will impact the outcome/ POC ; Examination LOW Complexity : 1-2 Standardized tests and measures addressing body structure, function, activity limitation and / or participation in recreation  ;Presentation LOW Complexity : Stable, uncomplicated  ;Clinical Decision Making MEDIUM Complexity : FOTO score of 26-74  Overall Complexity Rating: LOW   Problem List: pain affecting function, decrease ROM, decrease strength, edema affecting function, impaired gait/ balance, decrease ADL/ functional abilitiies, decrease activity tolerance, decrease flexibility/ joint mobility and decrease transfer abilities   Treatment Plan may include any combination of the following: Therapeutic exercise, Therapeutic activities, Neuromuscular re-education, Physical agent/modality, Manual therapy, Patient education, Self Care training, Functional mobility training and Other: pain science education, HEp  Patient / Family readiness to learn indicated by: asking questions, trying to perform skills and interest  Persons(s) to be included in education: patient (P)  Barriers to Learning/Limitations: None  Patient Goal (s): I want to be able to lift weighs and kickbox again.   Patient Self Reported Health Status: fair  Rehabilitation Potential: good    Short Term Goals: To be accomplished in 3 weeks:  1. Patient will be I with HEP. Eval: established  2. Patient will be able to lift 3lbs to a shelf at shoulder height without difficulty. Eval: extreme or unable. Long Term Goals: To be accomplished in 6 weeks:  1. Patient will be able to lift >15lbs from the ground without difficulty. Eval: unable  2. Patient will achieve full A/ROM on L UE without difficulty to facilitate full participation in functional and recreational activities. Eval: 155 abduction, 150 flexion  3. Patient will be able to return to at least 90% of her normal upper body workout routine. Eval: 5%    Frequency / Duration: Patient to be seen 2-3 times per week for 6 weeks.     Patient/ Caregiver education and instruction: Diagnosis, prognosis, self care, activity modification, exercises and other HEP and pain science education   [x]  Plan of care has been reviewed with MIKHAIL Regan, PT 10/22/2020 2:14 PM  ________________________________________________________________________    I certify that the above Therapy Services are being furnished while the patient is under my care. I agree with the treatment plan and certify that this therapy is necessary.     Physician's Signature:____________Date:_________TIME:________    [employer], Date and Time must be completed for valid certification **      Please sign and return to [facility] [address] [place] [address], [address] [place] [address], [address] [place] [address],[facility] [address] [phone] fax

## 2020-10-22 NOTE — PROGRESS NOTES
PT DAILY TREATMENT NOTE 10-18    Patient Name: Linda Arvizu  Date:10/22/2020  : 1975  [x]  Patient  Verified  Payor:  / Plan: [facility]  [facility] / Product Type:  /    In time:2:08P  Out time:2:43P  Total Treatment Time (min): 35min  Visit #: 1  18    Treatment Area: Pain in left shoulder [M25.512]    SUBJECTIVE  Pain Level (0-10 scale): 6/10  Any medication changes, allergies to medications, adverse drug reactions, diagnosis change, or new procedure performed?: [x] No    [] Yes (see summary sheet for update)  Subjective functional status/changes:   [] No changes reported  Patient reports she started having pain in 2019 and tried to ignore it because she thought it would get better on its own. Pain would be bad with certain movements then the pain would go away. Pain became more intense about 3 months ago and decided to have it looked at. MRI positive for partial RC tear. PMHx:  Back pain, depression, sleep dysfunction, incontinence  Currently retired [place] not working outside the home, but is avid exerciser. Patient goals: I want to get back to weight lifting and running  Current activity level is walking her dog for 30-60 min daily. Does c/o N/T down her L arm. OBJECTIVE    15 min [x]Eval                  []Re-Eval       10 min Therapeutic Exercise:  [x] See flow sheet :   Rationale: increase ROM and increase strength to improve the patients ability to A/ROM and decrease pain with movement. 10 min Therapeutic Activity:  [x]  See flow sheet :   Rationale: increase strength and improve coordination  to improve the patients ability to Tolerate basic ADLs and recreational-related tasks without pain.       10 min Neuromuscular Re-education:  [x]  See flow sheet :   Rationale: improve coordination, improve balance and increase proprioception  to improve the patients ability to perform activities with good form and proprioception with tactile and verbal cuing appropriately. With   [x] TE   [x] TA   [x] neuro   [] other: Patient Education: [x] Review HEP    [x] Progressed/Changed HEP based on:   [x] positioning   [] body mechanics   [] transfers   [] heat/ice application    [] other:      Other Objective/Functional Measures:     A/ROM: L shoulder flexion 150 degs, abduction 155 degs, external rotation 80 degs on L, 30 degs internal rotation  P/ROM L shoulder flexion 155, abduction 160degs    Able to lower with good scapulohumeral rhythm    Lift off test for RC: positive on L  Acevedo Test: negative  Neer's test for impingement: positive  Drop Arm Test: negative  Passive ER test: positive for pain  MMT: 3-/5 throughout L shoulder, 4/5 throughout R    Patient is R-handed    ULTT negative for nerve tension involvement    Pain Level (0-10 scale) post treatment: 4/10    ASSESSMENT/Changes in Function: see POC    Patient will continue to benefit from skilled PT services to modify and progress therapeutic interventions, address functional mobility deficits, address ROM deficits, address strength deficits, analyze and address soft tissue restrictions, analyze and cue movement patterns, analyze and modify body mechanics/ergonomics, assess and modify postural abnormalities, address imbalance/dizziness, instruct in home and community integration and HEP and pain science education to attain remaining goals.      [x]  See Plan of Care  []  See progress note/recertification  []  See Discharge Summary         Progress towards goals / Updated goals:  See POC    PLAN  [x]  Upgrade activities as tolerated     []  Continue plan of care  []  Update interventions per flow sheet       []  Discharge due to:_  []  Other:_      Omayra Regan, PT 10/22/2020  2:15 PM    No future appointments.'

## 2020-10-27 ENCOUNTER — HOSPITAL ENCOUNTER (OUTPATIENT)
Dept: PHYSICAL THERAPY | Age: 45
Discharge: HOME | End: 2020-10-27
Payer: OTHER GOVERNMENT

## 2020-10-27 PROCEDURE — 97112 NEUROMUSCULAR REEDUCATION: CPT

## 2020-10-27 PROCEDURE — 97530 THERAPEUTIC ACTIVITIES: CPT

## 2020-10-27 PROCEDURE — 97110 THERAPEUTIC EXERCISES: CPT

## 2020-10-27 PROCEDURE — 97140 MANUAL THERAPY 1/> REGIONS: CPT

## 2020-10-27 NOTE — PROGRESS NOTES
PT DAILY TREATMENT NOTE 10-18    Patient Name: Bryson Hare  Date:10/27/2020  : 1975  [x]  Patient  Verified  Payor: DEB / Plan: Eduin Aguirre 74 / Product Type: [employer] /    In time:928  Out time:1027  Total Treatment Time (min): 61  Visit #: 2 of 18     Treatment Area: Pain in left shoulder [M25.512]    SUBJECTIVE  Pain Level (0-10 scale): 6  Any medication changes, allergies to medications, adverse drug reactions, diagnosis change, or new procedure performed?: [x] No    [] Yes (see summary sheet for update)  Subjective functional status/changes:   [] No changes reported  Pt reports HEP is going well. She has been sore. OBJECTIVE    Modality rationale: decrease inflammation and decrease pain to improve the patients ability to complete functional activities.     Min Type Additional Details    [] Estim:  []Unatt       []IFC  []Premod                        []Other:  []w/ice   []w/heat  Position:  Location:    [] Estim: []Att    []TENS instruct  []NMES                    []Other:  []w/US   []w/ice   []w/heat  Position:  Location:    []  Traction: [] Cervical       []Lumbar                       [] Prone          []Supine                       []Intermittent   []Continuous Lbs:  [] before manual  [] after manual    []  Ultrasound: []Continuous   [] Pulsed                           []1MHz   []3MHz W/cm2:  Location:    []  Iontophoresis with dexamethasone         Location: [] Take home patch   [] In clinic   10 [x]  Ice     []  heat  []  Ice massage  []  Laser   []  Anodyne Position: semi reclined  Location: L shoulder    []  Laser with stim  []  Other:  Position:  Location:    []  Vasopneumatic Device Pressure:       [] lo [] med [] hi   Temperature: [] lo [] med [] hi   [x] Skin assessment post-treatment:  [x]intact []redness- no adverse reaction    []redness  adverse reaction:     14 min Therapeutic Exercise:  [x] See flow sheet :   Rationale: increase ROM and increase strength to improve the patients ability to complete functional activities. 10 min Therapeutic Activity:  [x]  See flow sheet :   Rationale: increase ROM, increase strength, improve coordination, increase proprioception and patient education  to improve the patients ability to complete household chores. 10 min Neuromuscular Re-education:  [x]  See flow sheet :   Rationale: increase ROM, increase strength, improve coordination and increase proprioception to improve the patients ability to complete functional task with correct body mechanics    15 min Manual Therapy:  Supine: L shoulder AP and inferior joint mobs grade 1-2, STM/DTM to L subscap  R sidelying: STM/DTM to L infraspinatus, supraspinatus, teres minor/major, lats, SA   Rationale: decrease pain, increase ROM, increase tissue extensibility, decrease trigger points and increase postural awareness to complete self care          With   [x] TE   [x] TA   [x] neuro   [] other: Patient Education: [x] Review HEP    [] Progressed/Changed HEP based on:   [] positioning   [] body mechanics   [] transfers   [] heat/ice application    [] other:      Other Objective/Functional Measures:      Pain Level (0-10 scale) post treatment: 5    ASSESSMENT/Changes in Function: Educated pt on the physiological effect of exercise to understand soreness from activity. Educated pt on stretches to complete with good return demo. Session focused on improving postural awareness and strength to improve abilities to complete functional activities. After manual therapy pt reported decreased pain and improve mobility. Verbal cues and demonstration required to use correct body mechanics. Encouraged pt to complete HEP daily to help to continue to progress PT interventions to improve patient's ability to complete functional and community task independently. At the conclusion of the session, pt reported decreased pain.     Patient will continue to benefit from skilled PT services to modify and progress therapeutic interventions, address functional mobility deficits, address ROM deficits, address strength deficits, analyze and address soft tissue restrictions, analyze and cue movement patterns, analyze and modify body mechanics/ergonomics, assess and modify postural abnormalities and instruct in home and community integration to attain remaining goals. [x]  See Plan of Care  []  See progress note/recertification  []  See Discharge Summary         Progress towards goals / Updated goals:  Short Term Goals: To be accomplished in 3 weeks:  1. Patient will be I with HEP. Eval: established    CURRENT: completing 10/27  2. Patient will be able to lift 3lbs to a shelf at shoulder height without difficulty. Eval: extreme or unable. Long Term Goals: To be accomplished in 6 weeks:  1. Patient will be able to lift >15lbs from the ground without difficulty. Eval: unable  2. Patient will achieve full A/ROM on L UE without difficulty to facilitate full participation in functional and recreational activities. Eval: 155 abduction, 150 flexion  3. Patient will be able to return to at least 90% of her normal upper body workout routine.                 Eval: 5%    PLAN  []  Upgrade activities as tolerated     [x]  Continue plan of care  []  Update interventions per flow sheet       []  Discharge due to:_  []  Other:_      Morales Calvillo 10/27/2020  9:32 AM    Future Appointments   Date Time Provider Trisha Bundy   11/3/2020  2:15 PM Emeli Paige, PT [facility]   11/5/2020 12:00 PM Asya Regan, PT [facility]   11/9/2020 12:00 PM Asya Regan, PT [facility]   11/11/2020 12:00 PM Asya Regan, PT [facility]   11/13/2020 12:00 PM Asya Regan, PT [facility]   11/16/2020 12:00 PM Devorah Haley [facility]   11/18/2020 12:00 PM Emeli Paige, PT [facility]   11/20/2020 12:00 PM Devorah Haley [facility]   11/23/2020 12:00 PM Gal Coleman, PT [facility]   11/25/2020 12:00 PM Gal Coleman, PT [facility]   11/27/2020 12:00 PM Gal Coleman, PT [facility]   11/30/2020 12:00 PM Vola Campanile [facility]   12/2/2020 12:00 PM Vola Campanile [facility]

## 2020-11-03 ENCOUNTER — HOSPITAL ENCOUNTER (OUTPATIENT)
Dept: PHYSICAL THERAPY | Age: 45
Discharge: HOME | End: 2020-11-03
Payer: OTHER GOVERNMENT

## 2020-11-03 PROCEDURE — 97112 NEUROMUSCULAR REEDUCATION: CPT | Performed by: PHYSICAL THERAPIST

## 2020-11-03 PROCEDURE — 97140 MANUAL THERAPY 1/> REGIONS: CPT | Performed by: PHYSICAL THERAPIST

## 2020-11-03 PROCEDURE — 97530 THERAPEUTIC ACTIVITIES: CPT | Performed by: PHYSICAL THERAPIST

## 2020-11-03 PROCEDURE — 97110 THERAPEUTIC EXERCISES: CPT | Performed by: PHYSICAL THERAPIST

## 2020-11-03 NOTE — PROGRESS NOTES
PT DAILY TREATMENT NOTE     Patient Name: Linda Arvizu  Date:11/3/2020  : 1975  [x]  Patient  Verified  Payor: DEB / Plan: [facility]in Aguirre 74 / Product Type:  /    In time:2:27P  Out time:3:00P  Total Treatment Time (min): 33min  Visit #: 3 of 18    Treatment Area: Pain in left shoulder [M25.512]    SUBJECTIVE  Pain Level (0-10 scale): 6/10  Any medication changes, allergies to medications, adverse drug reactions, diagnosis change, or new procedure performed?: [x] No    [] Yes (see summary sheet for update)  Subjective functional status/changes:   [] No changes reported  Patient states she has stopped taking the pain medication and has been using her arm more without any more pain than when she started therapy. OBJECTIVE    8 min Therapeutic Exercise:  [x] See flow sheet :   Rationale: increase ROM and increase strength to improve the patients ability to A/ROM and decrease pain with movement. 8 min Therapeutic Activity:  [x]  See flow sheet :   Rationale: increase strength and improve coordination  to improve the patients ability to Tolerate basic ADLs and recreational-related tasks without pain. Included patient education on how to scale pull-ups at home. 8 min Neuromuscular Re-education:  [x]  See flow sheet :   Rationale: improve coordination, improve balance and increase proprioception  to improve the patients ability to perform activities with good form and proprioception with tactile and verbal cuing appropriately. 8 min Manual Therapy:  Grade 2-4 mobs to scapulo-thoracic joint in sidelying   The manual therapy interventions were performed at a separate and distinct time from the therapeutic activities interventions. Rationale: decrease pain, increase ROM and increase tissue extensibility to the patients ability to perform functional tasks such as overhead reach.           With   [x] TE   [x] TA   [x] neuro   [] other: Patient Education: [x] Review HEP [x] Progressed/Changed HEP based on:   [x] positioning   [] body mechanics   [] transfers   [] heat/ice application    [] other:      Other Objective/Functional Measures: Increased resistance with good tolerance. Pain Level (0-10 scale) post treatment: 4/10    ASSESSMENT/Changes in Function: Patient is progressing towards goals of increased activity tolerance and decreased pain. Spent much of session today discussing her progress and that she is now able to do more with no increase in her pain. Discussed that its good to stress our bodies, but finding the right \"dose\" for her right now to maximize return to functional activity tolerance at desired levels. Patient will continue to benefit from skilled PT services to modify and progress therapeutic interventions, address functional mobility deficits, address ROM deficits, address strength deficits, analyze and address soft tissue restrictions, analyze and cue movement patterns, analyze and modify body mechanics/ergonomics, assess and modify postural abnormalities and instruct in home and community integration to attain remaining goals. [x]  See Plan of Care  []  See progress note/recertification  []  See Discharge Summary         Progress towards goals / Updated goals:  Short Term Goals: To be accomplished in 3 weeks:  1. Patient will be I with HEP.              Eval: established               CURRENT: completing 10/27  2. Patient will be able to lift 3lbs to a shelf at shoulder height without difficulty.              Eval: extreme or unable. Current: 1lb to shelf height without difficulty 11/3/2020  Long Term Goals: To be accomplished in 6 weeks:  1. Patient will be able to lift >15lbs from the ground without difficulty.              Eval: unable  2. Patient will achieve full A/ROM on L UE without difficulty to facilitate full participation in functional and recreational activities.              Eval: 155 abduction, 150 flexion  3.  Patient will be able to return to at least 90% of her normal upper body workout routine.                Eval: 5%    PLAN  [x]  Upgrade activities as tolerated     []  Continue plan of care  []  Update interventions per flow sheet       []  Discharge due to:_  []  Other:_      Abimbola Regan PT 11/3/2020  4:34 PM    Future Appointments   Date Time Provider Trisha Niharika   11/5/2020 12:00 PM Lorelei Warner, PT [facility]   11/9/2020 12:00 PM Abimbola Regan, PT [facility]   11/11/2020 12:00 PM Omayra Regan, PT [facility]   11/13/2020 12:00 PM Omayra Regan, PT [facility]   11/16/2020 12:00 PM Meseret Heath [facility]   11/18/2020 12:00 PM Abimbola Regan, PT [facility]   11/20/2020 12:00 PM Meseret Heath [facility]   11/23/2020 12:00 PM Abimbola Regan, PT [facility]   11/25/2020 12:00 PM Abimbola Regan, PT [facility]   11/27/2020 12:00 PM Lorelei Warner, PT [facility]   11/30/2020 12:00 PM Meseret Heath [facility]   12/2/2020 12:00 PM Meseret Heath [facility]

## 2020-11-05 ENCOUNTER — HOSPITAL ENCOUNTER (OUTPATIENT)
Dept: PHYSICAL THERAPY | Age: 45
Discharge: HOME | End: 2020-11-05
Payer: OTHER GOVERNMENT

## 2020-11-05 PROCEDURE — 97112 NEUROMUSCULAR REEDUCATION: CPT | Performed by: PHYSICAL THERAPIST

## 2020-11-05 PROCEDURE — 97140 MANUAL THERAPY 1/> REGIONS: CPT | Performed by: PHYSICAL THERAPIST

## 2020-11-05 PROCEDURE — 97530 THERAPEUTIC ACTIVITIES: CPT | Performed by: PHYSICAL THERAPIST

## 2020-11-05 PROCEDURE — 97110 THERAPEUTIC EXERCISES: CPT | Performed by: PHYSICAL THERAPIST

## 2020-11-05 NOTE — PROGRESS NOTES
PT DAILY TREATMENT NOTE     Patient Name: Uli Boswell  Date:2020  : 1975  [x]  Patient  Verified  Payor: DEB / Plan: Eduin Aguirre 74 / Product Type:  /    In time:12:00P  Out time:12:41P  Total Treatment Time (min): 41min  Visit #: 4 of 18    Treatment Area: Pain in left shoulder [M25.512]    SUBJECTIVE  Pain Level (0-10 scale): 5/10  Any medication changes, allergies to medications, adverse drug reactions, diagnosis change, or new procedure performed?: [x] No    [] Yes (see summary sheet for update)  Subjective functional status/changes:   [] No changes reported  Patient states she is feeling really good today - in a good mood, feeling better overall. OBJECTIVE  10 min Therapeutic Exercise:  [x]? See flow sheet :   Rationale: increase ROM and increase strength to improve the patients ability to A/ROM and decrease pain with movement.     10 min Therapeutic Activity:  [x]? See flow sheet :   Rationale: increase strength and improve coordination  to improve the patients ability to Tolerate basic ADLs and recreational-related tasks without pain. Included patient education on how to scale pull-ups at home. 9 min Neuromuscular Re-education:  [x]? See flow sheet :   Rationale: improve coordination, improve balance and increase proprioception  to improve the patients ability to perform activities with good form and proprioception with tactile and verbal cuing appropriately.     12 min Manual Therapy:  Grade 2-4 mobs to scapulo-thoracic joint in sidelying, Grade 5 mobs to CTJ in prone, grade 5 mobs in prone to TSP   The manual therapy interventions were performed at a separate and distinct time from the therapeutic activities interventions. Rationale: decrease pain, increase ROM and increase tissue extensibility to the patients ability to perform functional tasks such as overhead reach. With   [x]?  TE [x]? TA   [x]? neuro   []? other: Patient Education: [x]? Review HEP    [x]? Progressed/Changed HEP based on:   [x]? positioning   []? body mechanics   []? transfers   []? heat/ice application    []? other:       Other Objective/Functional Measures: Increased resistance with good tolerance.         Pain Level (0-10 scale) post treatment: 4/10     ASSESSMENT/Changes in Function: Patient is progressing towards goals of increased activity tolerance and decreased pain. Spent much of session today focusing on things she CAN do vs. What she can't. Continues to discuss that its good to stress our bodies, and finding the right \"dose\" for her right now to maximize return to functional activity tolerance at desired levels.      Patient will continue to benefit from skilled PT services to modify and progress therapeutic interventions, address functional mobility deficits, address ROM deficits, address strength deficits, analyze and address soft tissue restrictions, analyze and cue movement patterns, analyze and modify body mechanics/ergonomics, assess and modify postural abnormalities and instruct in home and community integration to attain remaining goals. [x]? See Plan of Care  []? See progress note/recertification  []? See Discharge Summary         Progress towards goals / Updated goals:  Short Term Goals: To be accomplished in 3 weeks:  1. Patient will be I with BEN Perkins              CURRENT: MET 11/5/2020  2. Patient will be able to lift 3lbs to a shelf at shoulder height without difficulty.              Eval: extreme or unable. Current: 1lb to shelf height without difficulty 11/3/2020  Long Term Goals: To be accomplished in 6 weeks:  1. Patient will be able to lift >15lbs from the ground without difficulty.              Eval: unable   Current: will attempt next session  2.  Patient will achieve full A/ROM on L UE without difficulty to facilitate full participation in functional and recreational activities.              Eval: 155 abduction, 150 flexion  3. Patient will be able to return to at least 90% of her normal upper body workout routine.                Eval: 5%     PLAN  [x]? Upgrade activities as tolerated     []? Continue plan of care  []? Update interventions per flow sheet       []? Discharge due to:_  []?   Other:_    Piotr Claros, PT 11/5/2020  12:30 PM    Future Appointments   Date Time Provider Trisha Bundy   11/9/2020 12:00 PM Lorelei Warner, PT [facility]   11/11/2020 12:00 PM Abimbola Regan, PT [facility]   11/13/2020 12:00 PM Omayra Regan, PT [facility]   11/16/2020 12:00 PM Meseret Heath [facility]   11/18/2020 12:00 PM Abimbola Regan, PT [facility]   11/20/2020 12:00 PM Meseret Heath [facility]   11/23/2020 12:00 PM Lorelei Warner, PT [facility]   11/25/2020 12:00 PM Lorelei Warner, PT [facility]   11/27/2020 12:00 PM [facility]lei Warner, PT [facility]   11/30/2020 12:00 PM Meseret Heath [facility]   12/2/2020 12:00 PM Meseret Heath [facility]

## 2020-11-09 ENCOUNTER — HOSPITAL ENCOUNTER (OUTPATIENT)
Dept: PHYSICAL THERAPY | Age: 45
Discharge: HOME | End: 2020-11-09
Payer: OTHER GOVERNMENT

## 2020-11-09 PROCEDURE — 97140 MANUAL THERAPY 1/> REGIONS: CPT | Performed by: PHYSICAL THERAPIST

## 2020-11-09 PROCEDURE — 97112 NEUROMUSCULAR REEDUCATION: CPT | Performed by: PHYSICAL THERAPIST

## 2020-11-09 PROCEDURE — 97530 THERAPEUTIC ACTIVITIES: CPT | Performed by: PHYSICAL THERAPIST

## 2020-11-09 PROCEDURE — 97110 THERAPEUTIC EXERCISES: CPT | Performed by: PHYSICAL THERAPIST

## 2020-11-09 NOTE — PROGRESS NOTES
PT DAILY TREATMENT NOTE     Patient Name: Uli Boswell  Date:2020  : 1975  [x]  Patient  Verified  Payor: DEB / Plan: Eduin Aguirre 74 / Product Type:  /    In time:11:56A  Out time:12:42P  Total Treatment Time (min): 46min  Visit #: 5 of 18    Treatment Area: Pain in left shoulder [M25.512]    SUBJECTIVE  Pain Level (0-10 scale): 3/10  Any medication changes, allergies to medications, adverse drug reactions, diagnosis change, or new procedure performed?: [x] No    [] Yes (see summary sheet for update)  Subjective functional status/changes:   [] No changes reported  Patient states she was able to go for a run on the beach this weekend and felt like her body was craving to do that and felt really good. Overall feeling progressively better. OBJECTIVE  10 min Therapeutic Exercise:  [x]? ? See flow sheet :   Rationale: increase ROM and increase strength to improve the patients ability to A/ROM and decrease pain with movement.     10 min Therapeutic Activity:  [x]? ?  See flow sheet :   Rationale: increase strength and improve coordination  to improve the patients ability to Tolerate basic ADLs and recreational-related tasks without pain. Included patient education on how to scale pull-ups at home.      14 min Neuromuscular Re-education:  [x]? ?  See flow sheet :   Rationale: improve coordination, improve balance and increase proprioception  to improve the patients ability to perform activities with good form and proprioception with tactile and verbal cuing appropriately.     12 min Manual Therapy:  Grade 2-4 mobs to [address] joint in prone, inf gide; Grade 5 mobs to CTJ in prone, grade 5 mobs in prone to TSP   The manual therapy interventions were performed at a separate and distinct time from the therapeutic activities interventions.   Rationale: decrease pain, increase ROM and increase tissue extensibility to the patients ability to perform functional tasks such as overhead reach.                                                                 With   [x]? ? TE   [x]? ? TA   [x]? ? neuro   []?? other: Patient Education: [x]? ? Review HEP    [x]? ? Progressed/Changed HEP based on:   [x]? ? positioning   []? ? body mechanics   []? ? transfers   []? ? heat/ice application    []? ? other:       Other Objective/Functional Measures: Increased resistance with good tolerance.         Pain Level (0-10 scale) post treatment: 0/10     ASSESSMENT/Changes in Function: Patient is progressing towards goals of increased activity tolerance and decreased pain. Spent much of session today focusing on positive vs. Negative self talk. Continues to discuss that its good to stress our bodies, and finding the right \"dose\" for her right now to maximize return to functional activity tolerance at desired levels.      Patient will continue to benefit from skilled PT services to modify and progress therapeutic interventions, address functional mobility deficits, address ROM deficits, address strength deficits, analyze and address soft tissue restrictions, analyze and cue movement patterns, analyze and modify body mechanics/ergonomics, assess and modify postural abnormalities and instruct in home and community integration to attain remaining goals.     [x]? ?  See Plan of Care  []? ?  See progress note/recertification  []? ?  See Discharge Summary         Progress towards goals / Updated goals:  Short Term Goals: To be accomplished in 3 weeks:  1. Patient will be I with HEP.              Jd Nevarez              CURRENT: MET 11/5/2020  2. Patient will be able to lift 3lbs to a shelf at shoulder height without difficulty.              Eval: extreme or unable.              PEÑA: MET 3lbs overhead without difficulty 11/9/2020    Long Term Goals: To be accomplished in 6 weeks:  1. Patient will be able to lift >15lbs from the ground without difficulty.                Eval: unable              Current: will attempt next session 11/9/2020  2. Patient will achieve full A/ROM on L UE without difficulty to facilitate full participation in functional and recreational activities.              Eval: 155 abduction, 150 flexion  3. Patient will be able to return to at least 90% of her normal upper body workout routine.                Eval: 5%     PLAN  [x]? ?  Upgrade activities as tolerated     []? ?  Continue plan of care  []? ?  Update interventions per flow sheet       []? ?  Discharge due to:_  []??  Other:_      Terell Nina, PT 11/9/2020  11:59 AM    Future Appointments   Date Time Provider Trisha Bundy   11/9/2020 12:00 PM Eric Victoria, PT [facility]   11/11/2020 12:00 PM Dante Regan, PT [facility]   11/13/2020 12:00 PM Omayra Regan, PT [facility]   11/16/2020 12:00 PM Karla Deras [facility]   11/18/2020 12:00 PM Dante Regan, PT [facility]   11/20/2020 12:00 PM Benikky Deras [facility]   11/23/2020 12:00 PM Eric Victoria, PT [facility]   11/25/2020 12:00 PM Eric Victoria, PT [facility]   11/27/2020 12:00 PM Eric Victoria, PT [facility]   11/30/2020 12:00 PM Karla Deras [facility]   12/2/2020 12:00 PM Karla Deras [facility]

## 2020-11-11 ENCOUNTER — HOSPITAL ENCOUNTER (OUTPATIENT)
Dept: PHYSICAL THERAPY | Age: 45
Discharge: HOME | End: 2020-11-11
Payer: OTHER GOVERNMENT

## 2020-11-11 PROCEDURE — 97112 NEUROMUSCULAR REEDUCATION: CPT | Performed by: PHYSICAL THERAPIST

## 2020-11-11 PROCEDURE — 97110 THERAPEUTIC EXERCISES: CPT | Performed by: PHYSICAL THERAPIST

## 2020-11-11 PROCEDURE — 97140 MANUAL THERAPY 1/> REGIONS: CPT | Performed by: PHYSICAL THERAPIST

## 2020-11-11 PROCEDURE — 97530 THERAPEUTIC ACTIVITIES: CPT | Performed by: PHYSICAL THERAPIST

## 2020-11-11 NOTE — PROGRESS NOTES
PT DAILY TREATMENT NOTE     Patient Name: Guera Bautista  Date:2020  : 1975  [x]  Patient  Verified  Payor:  / Plan: Eduin Aguirre 74 / Product Type:  /    In time:12:00P  Out time:12:50P  Total Treatment Time (min): 50min  Visit #: 6 of 18    Treatment Area: Pain in left shoulder [M25.512]    SUBJECTIVE  Pain Level (0-10 scale): 0/10  Any medication changes, allergies to medications, adverse drug reactions, diagnosis change, or new procedure performed?: [x] No    [] Yes (see summary sheet for update)  Subjective functional status/changes:   [] No changes reported  Patient states she did not sleep well last night due to having coffee at dinner time. No pain just super tired.      OBJECTIVE    Modality rationale: decrease pain and increase tissue extensibility to improve the patients ability to decrease exercise -induced muscle soreness   Min Type Additional Details    [] Estim:  []Unatt       []IFC  []Premod                        []Other:  []w/ice   []w/heat  Position:  Location:    [] Estim: []Att    []TENS instruct  []NMES                    []Other:  []w/US   []w/ice   []w/heat  Position:  Location:    []  Traction: [] Cervical       []Lumbar                       [] Prone          []Supine                       []Intermittent   []Continuous Lbs:  [] before manual  [] after manual    []  Ultrasound: []Continuous   [] Pulsed                           []1MHz   []3MHz W/cm2:  Location:    []  Iontophoresis with dexamethasone         Location: [] Take home patch   [] In clinic   10 [x]  Ice     []  heat  []  Ice massage  []  Laser   []  Anodyne Position: supine  Location: L shoulder    []  Laser with stim  []  Other:  Position:  Location:    []  Vasopneumatic Device Pressure:       [] lo [] med [] hi   Temperature: [] lo [] med [] hi   [] Skin assessment post-treatment:  []intact []redness- no adverse reaction    []redness - adverse reaction:   14 min Therapeutic Exercise:  [x]??? See flow sheet :   Rationale: increase ROM and increase strength to improve the patients ability to A/ROM and decrease pain with movement.     10 min Therapeutic Activity:  [x]? ??  See flow sheet :   Rationale: increase strength and improve coordination  to improve the patients ability to Tolerate basic ADLs and recreational-related tasks without pain. Included patient education on how to scale pull-ups at home.      10 min Neuromuscular Re-education:  [x]? ??  See flow sheet :   Rationale: improve coordination, improve balance and increase proprioception  to improve the patients ability to perform activities with good form and proprioception with tactile and verbal cuing appropriately.     12 min Manual Therapy:  Grade 2-4 mobs to [facility] joint in prone, inf glide; Grade 5 mobs to CTJ in prone, grade 5 mobs in prone to TSP   The manual therapy interventions were performed at a separate and distinct time from the therapeutic activities interventions. Rationale: decrease pain, increase ROM and increase tissue extensibility to the patients ability to perform functional tasks such as overhead reach.                                                                 With   [x]? ?? TE   [x]? ?? TA   [x]? ?? neuro   []? ?? other: Patient Education: [x]??? Review HEP    [x]? ?? Progressed/Changed HEP based on:   [x]? ?? positioning   []? ?? body mechanics   []? ?? transfers   []? ?? heat/ice application    []? ?? other:       Other Objective/Functional Measures: able to achieve full A/ROM with resistance utilizing tactile and visual feedback of exercise performance in front of the mirror.       Pain Level (0-10 scale) post treatment: 0/10     ASSESSMENT/Changes in Function: Patient is progressing towards goals of increased activity tolerance and decreased pain. Spent some of session today focusing on positive vs. Negative self talk. Continues to discuss that its good to stress our bodies.  Continue to encourage her to get back into her normal exercise routine. Spent time focusing on engaging core during overhead reaching activities.      Patient will continue to benefit from skilled PT services to modify and progress therapeutic interventions, address functional mobility deficits, address ROM deficits, address strength deficits, analyze and address soft tissue restrictions, analyze and cue movement patterns, analyze and modify body mechanics/ergonomics, assess and modify postural abnormalities and instruct in home and community integration to attain remaining goals.     [x]? ??  See Plan of Care  []? ??  See progress note/recertification  []? ??  See Discharge Summary         Progress towards goals / Updated goals:  Short Term Goals: To be accomplished in 3 weeks:  1. Patient will be I with HEP.              Markel Baxter              CURRENT: MET 11/5/2020  2. Patient will be able to lift 3lbs to a shelf at shoulder height without difficulty.              Eval: extreme or unable.              [id number]: MET 3lbs overhead without difficulty 11/9/2020     Long Term Goals: To be accomplished in 6 weeks:  1. Patient will be able to lift >15lbs from the ground without difficulty.              Eval: unable              LHNTZVD: will attempt next session 11/11/2020  2. Patient will achieve full A/ROM on L UE without difficulty to facilitate full participation in functional and recreational activities.              Eval: 155 abduction, 150 flexion  3. Patient will be able to return to at least 90% of her normal upper body workout routine.                Eval: 5%     PLAN  [x]???  Upgrade activities as tolerated     []? ??  Continue plan of care  []? ??  Update interventions per flow sheet       []???  Discharge due to:_  []???  Other:_       Aris Oglesby, PT 11/11/2020  1:19 PM    Future Appointments   Date Time Provider Trisha Bundy   11[facility]13[facility]2020 12:00 [facility] [address]   11/16/2020 12:00 PM Michael Mcgill [facility]   11/18/2020 12:00 PM Sigrid Regan, PT [facility]   11/20/2020 12:00 PM Giovanny Cruzbing [facility]   11/23/2020 12:00 PM Gayl Lefort, PT [facility]   11/25/2020 12:00 PM Gayl Lefort, PT [facility]   11/27/2020 12:00 PM Gayl Lefort, PT [facility]   11/30/2020 12:00 PM Giovanny Cruzbing [facility]   12/2/2020 12:00 PM Giovanny Ebbing [facility]

## 2020-11-13 ENCOUNTER — HOSPITAL ENCOUNTER (OUTPATIENT)
Dept: PHYSICAL THERAPY | Age: 45
Discharge: HOME | End: 2020-11-13
Payer: OTHER GOVERNMENT

## 2020-11-13 PROCEDURE — 97110 THERAPEUTIC EXERCISES: CPT | Performed by: PHYSICAL THERAPIST

## 2020-11-13 PROCEDURE — 97112 NEUROMUSCULAR REEDUCATION: CPT | Performed by: PHYSICAL THERAPIST

## 2020-11-13 PROCEDURE — 97140 MANUAL THERAPY 1/> REGIONS: CPT | Performed by: PHYSICAL THERAPIST

## 2020-11-13 PROCEDURE — 97530 THERAPEUTIC ACTIVITIES: CPT | Performed by: PHYSICAL THERAPIST

## 2020-11-13 NOTE — PROGRESS NOTES
PT DAILY TREATMENT NOTE     Patient Name: Allie Mayen  Date:2020  : 1975  [x]  Patient  Verified  Payor: DEB / Plan: Eduin Aguirre 74 / Product Type:  /    In time:12:00P  Out time:12:47P  Total Treatment Time (min): 47PM  Visit #: 7 of 18    Treatment Area: Pain in left shoulder [M25.512]    SUBJECTIVE  Pain Level (0-10 scale): 2/10  Any medication changes, allergies to medications, adverse drug reactions, diagnosis change, or new procedure performed?: [x] No    [] Yes (see summary sheet for update)  Subjective functional status/changes:   [] No changes reported  Patient states she is feeling much better, feels her arm is now a \"part of her body again\" and feels ready to discharge soon. OBJECTIVE  14 min Therapeutic Exercise:  [x]? ??? See flow sheet :   Rationale: increase ROM and increase strength to improve the patients ability to A/ROM and decrease pain with movement.     13 min Therapeutic Activity:  [x]? ???  See flow sheet :   Rationale: increase strength and improve coordination  to improve the patients ability to Tolerate basic ADLs and recreational-related tasks without pain. Included patient education on how to scale pull-ups at home.      10 min Neuromuscular Re-education:  [x]? ???  See flow sheet :   Rationale: improve coordination, improve balance and increase proprioception  to improve the patients ability to perform activities with good form and proprioception with tactile and verbal cuing appropriately.     10 min Manual Therapy:  Grade 2-4 mobs to GH joint in prone, inf glide; Grade 5 mobs to CTJ in prone, grade 5 mobs in prone to TSP   The manual therapy interventions were performed at a separate and distinct time from the therapeutic activities interventions.   Rationale: decrease pain, increase ROM and increase tissue extensibility to the patients ability to perform functional tasks such as overhead reach.                                                                 With   [x]? ??? TE   [x]? ??? TA   [x]? ??? neuro   []???? other: Patient Education: [x]???? Review HEP    [x]? ??? Progressed/Changed HEP based on:   [x]???? positioning   []???? body mechanics   []???? transfers   []???? heat/ice application    []???? other:       Other Objective/Functional Measures: Able to achieve full A/ROM in supine with L UE      Pain Level (0-10 scale) post treatment: 0/10     ASSESSMENT/Changes in Function: Patient is progressing towards goals of increased activity tolerance and decreased pain, anticipating discharge in 1-2 more visits. Spent some of session today focusing on positive vs. Negative self talk. Continues to progress towards goals. Continue to encourage her to get back into her normal exercise routine. Spent time focusing on engaging core during overhead reaching activities.      Patient will continue to benefit from skilled PT services to modify and progress therapeutic interventions, address functional mobility deficits, address ROM deficits, address strength deficits, analyze and address soft tissue restrictions, analyze and cue movement patterns, analyze and modify body mechanics/ergonomics, assess and modify postural abnormalities and instruct in home and community integration to attain remaining goals.     [x]? ???  See Plan of Care  []????  See progress note/recertification  []????  See Discharge Summary         Progress towards goals / Updated goals:  Short Term Goals: To be accomplished in 3 weeks:  1. Patient will be I with HEP.              Kaitlyn Perkins              CURRENT: MET 11/5/2020  2. Patient will be able to lift 3lbs to a shelf at shoulder height without difficulty.              Eval: extreme or unable.              Current: MET 3lbs overhead without difficulty 11/9/2020     Long Term Goals: To be accomplished in 6 weeks:  1.  Patient will be able to lift >15lbs from the ground without difficulty.              Eval: unable              [id number]: able to pull 15lbs 11/13/2020  2. Patient will achieve full A/ROM on L UE without difficulty to facilitate full participation in functional and recreational activities.              Eval: 155 abduction, 150 flexion   Current: 165 degs flexion, 160 degs abduction  3.  Patient will be able to return to at least 90% of her normal upper body workout routine.                Eval: 5%   Current: 75%     PLAN  [x]????  Upgrade activities as tolerated     []????  Continue plan of care  []????  Update interventions per flow sheet       []????  Discharge due to:_  []????  Other:_         Jose Bateman, PT 11/13/2020  12:20 PM    Future Appointments   Date Time Provider Trisha Bundy   11/16/2020 12:00 PM Lorelei MALIK [facility]   11/20/2020 12:00 PM Lorelei MALIK [facility]

## 2020-11-16 ENCOUNTER — HOSPITAL ENCOUNTER (OUTPATIENT)
Dept: PHYSICAL THERAPY | Age: 45
Discharge: HOME | End: 2020-11-16
Payer: OTHER GOVERNMENT

## 2020-11-16 PROCEDURE — 97530 THERAPEUTIC ACTIVITIES: CPT

## 2020-11-16 PROCEDURE — 97110 THERAPEUTIC EXERCISES: CPT

## 2020-11-16 PROCEDURE — 97112 NEUROMUSCULAR REEDUCATION: CPT

## 2020-11-16 NOTE — PROGRESS NOTES
PT DAILY TREATMENT NOTE     Patient Name: Richi Sánchez  Date:2020  : 1975  [x]  Patient  Verified  Payor: DEB / Plan: Eduin Aguirre 74 / Product Type: Cecile Pellet /    In SNRW:[id number]  Out time:1243  Total Treatment Time (min): 51  Visit #: 8 of 18    Treatment Area: Pain in left shoulder [M25.512]    SUBJECTIVE  Pain Level (0-10 scale): 1  Any medication changes, allergies to medications, adverse drug reactions, diagnosis change, or new procedure performed?: [x] No    [] Yes (see summary sheet for update)  Subjective functional status/changes:   [] No changes reported  Pt reports next session will be the last one. OBJECTIVE    15 min Therapeutic Exercise:  [x] See flow sheet :   Rationale: increase ROM and increase strength to improve the patients ability to complete functional activities. 11 min Therapeutic Activity:  [x]  See flow sheet :   Rationale: increase ROM, increase strength, improve coordination and increase proprioception  to improve the patients ability to complete household chores. 25 min Neuromuscular Re-education:  [x]  See flow sheet :   Rationale: increase motor control,  increase ROM, increase strength, improve coordination and increase proprioception to improve the patients ability to complete functional task with correct body mechanics       With   [x] TE   [x] TA   [x] neuro   [] other: Patient Education: [x] Review HEP    [] Progressed/Changed HEP based on:   [] positioning   [] body mechanics   [] transfers   [] heat/ice application    [] other:      Other Objective/Functional Measures:     Pain Level (0-10 scale) post treatment: 0    ASSESSMENT/Changes in Function: continued to address UE strengthening and stabilization to improve abilities to complete functional activities. Pt is able to tolerate increased reps and interventions which shows improvement in strength and endurance due to skilled PT interventions.  Continued to discuss the importance of stretches daily to help with soreness. Verbal cues and demonstration required to use correct body mechanics. Encouraged pt to complete HEP daily to help to continue to progress PT interventions to improve patient's ability to complete functional and community task independently. At the conclusion of the session, pt reported soreness from activity. Patient will continue to benefit from skilled PT services to modify and progress therapeutic interventions, address functional mobility deficits, address ROM deficits, address strength deficits, analyze and address soft tissue restrictions, analyze and cue movement patterns, analyze and modify body mechanics/ergonomics, assess and modify postural abnormalities and instruct in home and community integration to attain remaining goals. [x]  See Plan of Care  []  See progress note/recertification  []  See Discharge Summary         Progress towards goals / Updated goals:  Short Term Goals: To be accomplished in 3 weeks:  1. Patient will be I with HEP.              EvalElihue Distance              CURRENT: MET 11/5/2020  2. Patient will be able to lift 3lbs to a shelf at shoulder height without difficulty.              Eval: extreme or unable.              Current: MET 3lbs overhead without difficulty 11/9/2020     Long Term Goals: To be accomplished in 6 weeks:  1. Patient will be able to lift >15lbs from the ground without difficulty.              Eval: unable              XXVOVAY: able to pull 15lbs 11/13/2020  2. Patient will achieve full A/ROM on L UE without difficulty to facilitate full participation in functional and recreational activities.              Eval: 155 abduction, 150 flexion              Current: 165 degs flexion, 160 degs abduction  3.  Patient will be able to return to at least 90% of her normal upper body workout routine.                Eval: 5%              Current: 75%    PLAN  []  Upgrade activities as tolerated     [x]  Continue plan of care  []  Update interventions per flow sheet       []  Discharge due to:_  [x]  Other: D/C JOVON Calvillo 11/16/2020  11:52 AM    Future Appointments   Date Time Provider Trisha Bundy   11/16/2020 12:00 PM Ant Sotelo [facility]   11/20/2020 12:00 PM Ant Sotelo [facility]

## 2020-11-18 ENCOUNTER — APPOINTMENT (OUTPATIENT)
Dept: PHYSICAL THERAPY | Age: 45
End: 2020-11-18
Payer: OTHER GOVERNMENT

## 2020-11-20 ENCOUNTER — HOSPITAL ENCOUNTER (OUTPATIENT)
Dept: PHYSICAL THERAPY | Age: 45
Discharge: HOME | End: 2020-11-20
Payer: OTHER GOVERNMENT

## 2020-11-20 PROCEDURE — 97112 NEUROMUSCULAR REEDUCATION: CPT

## 2020-11-20 PROCEDURE — 97110 THERAPEUTIC EXERCISES: CPT

## 2020-11-20 PROCEDURE — 97530 THERAPEUTIC ACTIVITIES: CPT

## 2020-11-20 NOTE — PROGRESS NOTES
Physical Therapy Discharge Instructions    [facility] [address], [address] [place] [address], [facility] [address]  [phone] fax    Patient: Aj Light  : 1975    Continue Home Exercise Program   *stretches daily  * strengthening per body part 2-3x/week  *cardio 5x/week 30 min    Continue with    [] Ice (new injury/inc activities) as needed (20 min)     [] Heat (tightness or old injury)         Follow up with MD:     [x] Upon completion of therapy     [] As needed    Recommendations:     [x]   Return to activity with home program    []   Return to activity with the following modifications:       []Post Rehab Program    []Join Independent aquatic program     []Return to/join local gym      Additional Comments: Thank you for choosing us for your PT services. It was a pleasure working with you.  Best wishekaterina Blackwell 2020 12:32 PM

## 2020-11-20 NOTE — PROGRESS NOTES
PT DISCHARGE DAILY NOTE AND [id number]  Patient name: Abdifatah Pratt Start of Care: 10/22/2020   Referral source: Mary Palomares NP : 1975                Medical Diagnosis: Pain in left shoulder [M25.512]  Payor:  / Plan: Eduin Aguirre 74 / Product Type:  /  Onset Date:2020                Treatment Diagnosis: L shoulder pain   Prior Hospitalization: see medical history Provider#: [id number]   Medications: Verified on Patient summary List    Comorbidities: Patient reports; back pain, incontinence, sleep dysfunction, depression. Prior Level of Function: I with ADLs, weightlifting and kickboxing for exercise    Visits from Start of Care: 9    Missed Visits: 0    Reporting Period : 10/22/20 to 20    Date:2020  : 1975  [x]  Patient  Verified  Payor: [employer] / Plan: Eduin Timothy 74 / Product Type: Coby Poser /    In time:1200  Out time:1237  Total Treatment Time (min): 37  Visit #: 9 of 18    SUBJECTIVE  Pain Level (0-10 scale): 0  Any medication changes, allergies to medications, adverse drug reactions, diagnosis change, or new procedure performed?: [x] No    [] Yes (see summary sheet for update)  Subjective functional status/changes:   [] No changes reported  Pt reports 90% improvement due to skilled PT interventions with improved strength, mobility, decreased pain, and knowledge of how to manage condition. She has been able to run again. She is ready for discharge. OBJECTIVE    12 min Therapeutic Exercise:  [x] See flow sheet :   Rationale: increase ROM, increase strength and updated HEP to improve the patients ability to complete functional activities. 15 min Therapeutic Activity:  [x]  See flow sheet :   Rationale: increase ROM, increase strength, improve coordination, increase proprioception and patient education, reassessment, FOTO, discharge instructions  to improve the patients ability to complete household chores.       10 min Neuromuscular Re-education:  [x]  See flow sheet :   Rationale: increase ROM, increase strength, improve coordination and increase proprioception  to improve the patients ability to activate core effectively and maintain proper posture to decrease pain and improve body mechanics with functional activity. With   [x] TE   [x] TA   [x] neuro   [] other: Patient Education: [x] Review HEP    [] Progressed/Changed HEP based on:   [] positioning   [] body mechanics   [] transfers   [] heat/ice application    [] other:      Other Objective/Functional Measures:   Access Code: [email or web address]   URL: [email or web address]   Date: 11/20/2020   Prepared by: Ania Lucas     Exercises   Seated Shoulder Press Ups on [address] 10 reps - 3 sets - 1x daily - 7x weekly   Bench Press - 10 reps - 3 sets - 1x daily - 7x weekly   Supine Chest Flys - 10 reps - 3 sets - 1x daily - 7x weekly   Standing Bent Over Triceps Extension - 10 reps - 3 sets - 1x daily - 7x weekly   Elbow Extension with Resistance - 10 reps - 3 sets - 1x daily - 7x weekly   Prone Single Arm Shoulder Y - 10 reps - 3 sets - 1x daily - 7x weekly   Prone Shoulder Extension - Single Arm - 10 reps - 3 sets - 1x daily - 7x weekly   Prone Single Arm Shoulder Horizontal Abduction with Dumbbell - 10 reps - 3 sets - 1x daily - 7x weekly   Standing Single Arm Shoulder Abduction with Resistance - 10 reps - 3 sets - 1x daily - 7x weekly   Standing Shoulder Single Arm PNF D2 Flexion with Resistance - 10 reps - 3 sets - 1x daily - 7x weekly   Single Arm Bent Over Shoulder Horizontal Abduction with Dumbbell - Palm Down - 10 reps - 3 sets - 1x daily - 7x weekly   Shoulder External Rotation and Scapular Retraction with Resistance - 10 reps - 3 sets - 1x daily - 7x weekly   Shoulder Flexion Serratus Activation with Resistance - 10 reps - 3 sets - 1x daily - 7x weekly   Standing Plank on Wall with Reaches and Resistance - 10 reps - 3 sets - 1x daily - 7x weekly   Side Plank with Clam and Resistance - 10 reps - 3 sets - 1x daily - 7x weekly   Supine Band Pull-Aparts - 10 reps - 3 sets - 1x daily - 7x weekly   Standing Upright Shoulder Row with Resistance - 10 reps - 3 sets - 1x daily - 7x weekly   Quadruped Alternating Arm Lift - 10 reps - 3 sets - 1x daily - 7x weekly   Plank on Knees - 10 reps - 3 sets - 1x daily - 7x weekly   Superman on Table - 10 reps - 3 sets - 1x daily - 7x weekly   Standing Lower Cervical and Upper Thoracic Stretch - 3 reps - 1 sets - 30 hold - 1x daily - 7x weekly   Doorway Pec Stretch at 60 Elevation - 3 reps - 1 sets - 30 hold - 1x daily - 7x weekly   Child's Pose with Sidebending - 3 reps - 1 sets - 30 hold - 1x daily - 7x weekly   Standing Shoulder Posterior Capsule Stretch - 3 reps - 1 sets - 30 hold - 1x daily - 7x weekly   Seated Shoulder External Rotation PROM on Table - 3 reps - 1 sets - 30 hold - 1x daily - 7x weekly   Standing 'L' Stretch at Counter - 3 reps - 1 sets - 30 hold - 1x daily - 7x weekly   Standing Overhead Triceps Stretch - 3 reps - 1 sets - 30 hold - 1x daily - 7x weekly   Chest and Bicep Stretch - Arms Behind Back - 3 reps - 1 sets - 30 hold - 1x daily - 7x weekly   Standing Pec Stretch at Wall - 3 reps - 1 sets - 30 hold - 1x daily - 7x weekly   Doorway Rhomboid Stretch - 3 reps - 1 sets - 30 hold - 1x daily - 7x weekly      Pain Level (0-10 scale) post treatment: 0    Summary of Care:  Goal: Patient will be I with HEP. Status at last note/certification: compliance  Status at discharge: met    Goal: Patient will be able to lift >15lbs from the ground without difficulty. Status at last note/certification: 04#  Status at discharge: met    Goal: Patient will achieve full A/ROM on L UE without difficulty to facilitate full participation in functional and recreational activities.    Status at last note/certification: 977  Status at discharge: met    Goal: Patient will be able to return to at least 90% of her normal upper body workout routine. Status at last note/certification: 20%  Status at discharge: met    ASSESSMENT/Changes in Function: Pt has completed 9 skilled PT interventions to address L shoulder pain. Pt reports 90% improvement and is ready for discharge. After re-evaluation, pt has met all PT goals and improved FOTO score noted. Updated HEP and written hardcopy printed for pt. Pt will be discharged from physical therapy today with recommendations to continue completing HEP daily independently and follow up with physician after completion of PT.     Thank you for this referral!      PLAN  [x]Discontinue therapy: [x]Patient has reached or is progressing toward set goals      []Patient is non-compliant or has abdicated      []Due to lack of appreciable progress towards set goals    Meredith Winkler 11/20/2020  12:07 PM

## 2020-11-23 ENCOUNTER — APPOINTMENT (OUTPATIENT)
Dept: PHYSICAL THERAPY | Age: 45
End: 2020-11-23
Payer: OTHER GOVERNMENT

## 2020-11-25 ENCOUNTER — APPOINTMENT (OUTPATIENT)
Dept: PHYSICAL THERAPY | Age: 45
End: 2020-11-25
Payer: OTHER GOVERNMENT

## 2020-11-27 ENCOUNTER — APPOINTMENT (OUTPATIENT)
Dept: PHYSICAL THERAPY | Age: 45
End: 2020-11-27
Payer: OTHER GOVERNMENT

## 2020-11-30 ENCOUNTER — APPOINTMENT (OUTPATIENT)
Dept: PHYSICAL THERAPY | Age: 45
End: 2020-11-30
Payer: OTHER GOVERNMENT

## 2020-12-02 ENCOUNTER — APPOINTMENT (OUTPATIENT)
Dept: PHYSICAL THERAPY | Age: 45
End: 2020-12-02

## 2021-08-20 ENCOUNTER — HOSPITAL ENCOUNTER (OUTPATIENT)
Dept: PHYSICAL THERAPY | Age: 46
End: 2021-08-20

## 2021-09-24 ENCOUNTER — APPOINTMENT (OUTPATIENT)
Dept: PHYSICAL THERAPY | Age: 46
End: 2021-09-24

## 2021-11-03 ENCOUNTER — HOSPITAL ENCOUNTER (OUTPATIENT)
Dept: PHYSICAL THERAPY | Age: 46
Discharge: HOME | End: 2021-11-03
Payer: OTHER GOVERNMENT

## 2021-11-03 PROCEDURE — 97162 PT EVAL MOD COMPLEX 30 MIN: CPT

## 2021-11-03 PROCEDURE — 97110 THERAPEUTIC EXERCISES: CPT

## 2021-11-03 PROCEDURE — 97530 THERAPEUTIC ACTIVITIES: CPT

## 2021-11-03 NOTE — PROGRESS NOTES
PT DAILY TREATMENT NOTE/LUMBAR EVAL     Patient Name: Danielle Ruiz  Date:11/3/2021  : 1975  [x]  Patient  Verified  Payor: [facility] / Plan: [facility] CCN / Product Type: Federal Funded Programs /    In time: 9:55A Out time:10:30A  Total Treatment Time (min): 35  Visit #: 1 of 10    Treatment Area: Other low back pain [M54.59]  Dorsalgia [M54.9]  SUBJECTIVE  Pain Level (0-10 scale): 9  []constant []intermittent []improving [x]worsening []no change since onset    Any medication changes, allergies to medications, adverse drug reactions, diagnosis change, or new procedure performed?: [x] No    [] Yes (see summary sheet for update)  Subjective functional status/changes:     PLOF: Ind/pain free performing ADL/IADLs, self care tasks, recreational activities and standing/sitting prolonged durations  Limitations to PLOF: Increased Pain, Limited Standing/Sitting Tolerance    Mechanism of Injury: Pt reports insidious onset of low back pain that began around 2 yrs ago. Began participation in PT services shortly after onset, however discharged shortly after secondary to COVID-19 pandemic; Confirms noticing improvements in overall pain levels in short time of participating w/ symptoms returning after discharge and worsening over time. Current symptoms/Complaints: Describes pain as achy and sharp located at central low back region. , aggravated by standing/sitting for extended durations (>1 min.) and ambulating/hiking >30 min. Denies numbness/tingling to B LE, however experiences intermittent shooting (to B knee). Enjoys Yoga/Pilates. , however has not returned since onset of symptoms. PMHx/Surgical Hx: Pt reports: Depression, Fibromyalgia, Latex Allergy  Pt Goals: more tolerable pain      OBJECTIVE/EXAMINATION    17 min [x]?????????????? ?? Eval                  []?????????????? ? ? Re-Eval       8 min Therapeutic Exercise:  [x]???????????????? See flow sheet :   Rationale: increase ROM and increase strength to improve the patients ability to perform ADLs with greater ease     10 min Therapeutic Activity:  []????????????????  See flow sheet :   Rationale: pt awarness/education of patho and HEP  to improve the patients ability to return to PLOF                                                                  With   [x]???????????? TE   [x]???????????? TA   []???????????? neuro   []???????????? other: Patient Education: [x]???????????? Review HEP    []???????????? Progressed/Changed HEP based on:   []???????????? positioning   []???????????? body mechanics   []???????????? transfers   []???????????? heat/ice application    []???????????? other:          Neuro Screen [x]? WNL  Myotome/Dermatome/Reflexes:  Comments:     Palpation  []? Min  [x]? Mod  []? Severe    Location:  B lumbar paraspinals/QL and glute med,    Posture/Observation:    Sitting: Fwd Flex; consistently moving/changing positions    Standing: Increased lumbar lordosis; consistent weight shift      Functional Sit<>Stand:       Fwd trunk, increased weight shift to left LE, Min increased B knee genu valgum      Sensation:     B LE intact to light touch      Core Strength:     Inability to maintain PPT w/ DL lowering on plinth                 Special Tests:     Edis/Fadir: (-)     SLR: (-)   Slump Test:  (+) Right LE    SLS/Stability/Balance:      SLS: Right: 20 sec ; Left: 16 sec     Joint Mobility:     Hypomobile  L3-L5     MMT (0-5):    Left Hip Flex: 4/5    Left Hip IR/ER: 4/5    Left Knee Flex/Ext: 4+/5    Left Hip Abd/Ext: 4/5  Right Hip Flex: 4/5    Right Hip IR/ER: 4/5    Right Knee Flex/Ext: 4/5    Right Hip Abd/Ext:  4-/5     Active Movements:   ROM % AROM Comments:pain, area   Forward flexion 40-60 75 Tight; (+) gowers sign   Extension 20-30 75 \"feels good\"   SB right 20-30 50 Pain at end range   SB left 20-30 50 Pain at end range   Rotation right 5-10 25 Pain at end range    Rotation left 5-10 75 Pain at end range   Repeated Movements   (+) Ext     Pain Level (0-10 scale) post treatment: 5    ASSESSMENT/Changes in Function: See POC     Patient will continue to benefit from skilled PT services to modify and progress therapeutic interventions, address functional mobility deficits, address ROM deficits, address strength deficits, analyze and address soft tissue restrictions, analyze and cue movement patterns, analyze and modify body mechanics/ergonomics and assess and modify postural abnormalities to attain remaining goals.      [x]  See Plan of Care  []  See progress note/recertification  []  See Discharge Summary         Progress towards goals / Updated goals:  See POC    PLAN  [x]  Upgrade activities as tolerated     [x]  Continue plan of care  []  Update interventions per flow sheet       []  Discharge due to:_  []  Other:_      Jose Alejandro DPT 11/3/2021  9:52 AM

## 2021-11-03 NOTE — PROGRESS NOTES
[facility] [address], [address] [phone] fax    Plan of Care/ Statement of Necessity for Physical Therapy Services    Patient name: Unique Putnam Start of Care: 11/3/2021   Referral source: Radha Walker MD : 1975    Medical Diagnosis: Other low back pain [M54.59]  Dorsalgia [M54.9]  Payor: [facility] CCN / Plan: Cj Vazquez / Product Type: Federal Funded Programs /    Onset Date:2 yrs ago    Treatment Diagnosis: Low Back Pain    Prior Hospitalization: see medical history Provider#: [id number]   Medications: Verified on Patient summary List    Comorbidities: Pt reports: Depression, Fibromyalgia, Latex Allergy   Prior Level of Function: Ind/pain free performing ADL/IADLs, self care tasks, recreational activities and standing/sitting prolonged durations         The Plan of Care and following information is based on the information from the initial evaluation. Assessment/ key information:   Patient presents to clinic secondary to insidious onset of low back pain reported to have begun around 2 yrs ago and has worsened since. Todays clinical examination demonstrates soft tissue restrictions/TTP through B lumbar paraspinals/QL and glute med, joint hypomobility, (+) right LE neural tension evident through (+) slump test, (+) response to ext biased movement, decreased function (evident by FOTO score), decreased core/B LE strength/power, decreased AROM, flexibility, balance/stability and overall mobility limitations/compensatory movement patterns. These limitations affect the patient's ability to perform ADLs/IADLs, self care tasks, recreational activities and sit/stand for prolonged durations. The patient would benefit from skilled physical therapy interventions to address the aforementioned impairments in order to return to her PLOF of completing all functional activities pain free and independently as possible. Evaluation Complexity History MEDIUM  Complexity : 1-2 comorbidities / personal factors will impact the outcome/ POC ; Examination MEDIUM Complexity : 3 Standardized tests and measures addressing body structure, function, activity limitation and / or participation in recreation  ;Presentation MEDIUM Complexity : Evolving with changing characteristics  ; Clinical Decision Making MEDIUM Complexity : FOTO score of 26-74  Overall Complexity Rating: MEDIUM  Problem List: pain affecting function, decrease ROM, decrease strength, impaired gait/ balance, decrease ADL/ functional abilitiies, decrease activity tolerance, decrease flexibility/ joint mobility and decrease transfer abilities   Treatment Plan may include any combination of the following: Therapeutic exercise, Therapeutic activities, Neuromuscular re-education, Physical agent/modality, Gait/balance training, Manual therapy, Patient education, Self Care training, Functional mobility training, Home safety training and Stair training  Patient / Family readiness to learn indicated by: asking questions, trying to perform skills and interest  Persons(s) to be included in education: patient (P)  Barriers to Learning/Limitations: None  Patient Goal (s): more tolerable pain  Patient Self Reported Health Status: fair  Rehabilitation Potential: good    Short Term Goals: To be accomplished in 2 treatments:   1. Patient will become proficient in their HEP and will be compliant in performing that program.   Evaluation: HEP established.      Long Term Goals: To be accomplished in 10 treatments:   1. Patient's pain level will be 0-2/10 with activity in order to improve patient's ability to perform normal ADLs.    Evaluation: 3-7/10 with activity   2. Patient will increase FOTO score to >/= 51 points to indicate increased functional mobility.   Evaluation: 38 points   3.  Patient will demonstrate B hip abd/ext MMT 5/5 to perform ADL/IADLs  with greater ease   Evaluation:right hip abd/ext: 4-/5, left hip abd/ext: 4/5  4. Patient will report ability to stand/sit >/= 60 minutes to increase ease/ind with performance of ADLs  Evaluation: Standing/Sitting tolerance <1 minute    Frequency / Duration: Patient to be seen 2 times per week for 10 treatments. Patient/ Caregiver education and instruction: Diagnosis, prognosis, self care, activity modification and exercises   [x]  Plan of care has been reviewed with MIKHAIL Cleaning DPT 11/3/2021 9:50 AM    ________________________________________________________________________    I certify that the above Therapy Services are being furnished while the patient is under my care. I agree with the treatment plan and certify that this therapy is necessary.     [de-identified] Signature:____________Date:_________TIME:________     Liliana Forte MD  ** Signature, Date and Time must be completed for valid certification **    Please sign and return to [facility] [place] [id number]  [address] [facility], [address]  [phone] fax

## 2021-11-04 ENCOUNTER — HOSPITAL ENCOUNTER (OUTPATIENT)
Dept: PHYSICAL THERAPY | Age: 46
Discharge: HOME | End: 2021-11-04
Payer: OTHER GOVERNMENT

## 2021-11-04 PROCEDURE — 97530 THERAPEUTIC ACTIVITIES: CPT

## 2021-11-04 PROCEDURE — 97110 THERAPEUTIC EXERCISES: CPT

## 2021-11-04 PROCEDURE — 97112 NEUROMUSCULAR REEDUCATION: CPT

## 2021-11-04 NOTE — PROGRESS NOTES
PT DAILY TREATMENT NOTE     Patient Name: Dorothea Cota  Date:2021  : 1975  [x]  Patient  Verified  Payor: [facility] CCN / Plan: [facility] CCN / Product Type: Federal Funded Programs /    In time: 9:05A  Out time: 9:48A  Total Treatment Time (min): 43  Visit #: 2 of 10    Treatment Area: Other low back pain [M54.59]  Dorsalgia [M54.9]    SUBJECTIVE  Pain Level (0-10 scale): 7  Any medication changes, allergies to medications, adverse drug reactions, diagnosis change, or new procedure performed?: [x] No    [] Yes (see summary sheet for update)  Subjective functional status/changes:   [] No changes reported  Pt states no change in presentation following yesterday's eval.  Performed HEP following eval yesterday w/ no issues to report.      OBJECTIVE    12 min Therapeutic Exercise:  [x] See flow sheet :   Rationale: increase ROM and increase strength to improve the patients ability to increase ease with ADL performance    8 min Therapeutic Activity:  [x]  See flow sheet :   Rationale: increase ROM and increase strength  to improve the patients ability to perform functional transfers/movements with greater      23 min Neuromuscular Re-education:  [x]  See flow sheet :   Rationale: increase strength, improve coordination, improve balance and increase proprioception  to improve the patients ability to perform recreation activities with greater ease     With   [x] TE   [x] TA   [x] neuro   [] other: Patient Education: [x] Review HEP    [] Progressed/Changed HEP based         on:   [] positioning   [] body mechanics   [] transfers   [] heat/ice application    [] other:      Other Objective/Functional Measures: Initiated ex per flow sheet      Pain Level (0-10 scale) post treatment: 6.5    ASSESSMENT/Changes in Function: Initiated activity program per POC - occasional cuing for optimal TA/glute act, however pt w/ demonstration of good tolerance reporting a reduction in symptoms post session. Patient will continue to benefit from skilled PT services to modify and progress therapeutic interventions, address functional mobility deficits, address ROM deficits, address strength deficits, analyze and address soft tissue restrictions, analyze and cue movement patterns, analyze and modify body mechanics/ergonomics and assess and modify postural abnormalities to attain remaining goals. [x]  See Plan of Care  []  See progress note/recertification  []  See Discharge Summary         Progress towards goals / Updated goals:  Short Term Goals: To be accomplished in 2 treatments:   1. Patient will become proficient in their HEP and will be compliant in performing that program.   Evaluation: HEP established.      Long Term Goals: To be accomplished in 10 treatments:   1. Patient's pain level will be 0-2/10 with activity in order to improve patient's ability to perform normal ADLs.    Evaluation: 3-7/10 with activity   2. Patient will increase FOTO score to >/= 51 points to indicate increased functional mobility.   Evaluation: 38 points   3.  Patient will demonstrate B hip abd/ext MMT 5/5 to perform ADL/IADLs  with greater ease   Evaluation:right hip abd/ext: 4-/5, left hip abd/ext: 4/5  4. Patient will report ability to stand/sit >/= 60 minutes to increase ease/ind with performance of ADLs  Evaluation: Standing/Sitting tolerance <1 minute    PLAN  [x]  Upgrade activities as tolerated     [x]  Continue plan of care  []  Update interventions per flow sheet       []  Discharge due to:_  []  Other:_      Alex Ventura DPT 11/4/2021  9:21 AM    Future Appointments   Date Time Provider Trisha Bnudy   11/9/2021  8:15 AM Laurie Rae DPT [facility]   11/11/2021  1:30 PM Slava Dumont PT [facility]   11/16/2021  9:45 AM Misty Ramos PT [facility]   11/18/2021  8:15 AM FELICITA Mcgill [facility]   11/22/2021  8:15 AM Slava Dumont PT [facility]   11/24/2021  2:15 PM Genesis Felix Breen, DPT [facility]   11/30/2021  8:15 AM [facility]edo [facility]   12/2/2021 10:30 AM Burgess Efren PTA [facility]   1/10/2022  9:30 AM [facility] Hank Kennedy [facility]   1/10/2022  9:45 AM MD Eulalia MendezDelta

## 2021-11-09 ENCOUNTER — HOSPITAL ENCOUNTER (OUTPATIENT)
Dept: PHYSICAL THERAPY | Age: 46
Discharge: HOME | End: 2021-11-09
Payer: OTHER GOVERNMENT

## 2021-11-09 PROCEDURE — 97112 NEUROMUSCULAR REEDUCATION: CPT

## 2021-11-09 PROCEDURE — 97530 THERAPEUTIC ACTIVITIES: CPT

## 2021-11-09 PROCEDURE — 97110 THERAPEUTIC EXERCISES: CPT

## 2021-11-09 NOTE — PROGRESS NOTES
PT DAILY TREATMENT NOTE     Patient Name: Skylar Manjarrez  Date:2021  : 1975  [x]  Patient  Verified  Payor: [facility] CCN / Plan: [facility] CCN / Product Type: Federal Funded Programs /    In time:1030  Out time:1116  Total Treatment Time (min): 46  Visit #: 3 of 10    Treatment Area: Other low back pain [M54.59]  Dorsalgia [M54.9]    SUBJECTIVE  Pain Level (0-10 scale): 8  Any medication changes, allergies to medications, adverse drug reactions, diagnosis change, or new procedure performed?: [x] No    [] Yes (see summary sheet for update)  Subjective functional status/changes:   [] No changes reported  Pt reports her pain is elevated today after being stuck in traffic on her way here. OBJECTIVE    10 min Therapeutic Exercise:  [x] See flow sheet :   Rationale: increase ROM and increase strength to improve the patients ability to perform ADLs    13 min Therapeutic Activity:  [x]  See flow sheet : functional LE strengthening   Rationale: increase strength and improve coordination  to improve the patients ability to negotiate home and community      23 min Neuromuscular Re-education:  [x]  See flow sheet : core stabilization, glute virgil   Rationale: increase strength, improve coordination and increase proprioception  to improve the patients ability to tolerate sustained postures          With   [] TE   [] TA   [] neuro   [] other: Patient Education: [x] Review HEP    [] Progressed/Changed HEP based on:   [] positioning   [] body mechanics   [] transfers   [] heat/ice application    [] other:      Other Objective/Functional Measures: progressed exercises per flow sheet      Pain Level (0-10 scale) post treatment: 5    ASSESSMENT/Changes in Function: Pt tolerated progression of exercises with out increase in symptoms. Pt did very well with initiation of RDL, required intial cuing but maintained good technique throughout.  Pt educated on frequency of extension based exercises and avoidance of lumbar flexion to reduce symptoms. Patient will continue to benefit from skilled PT services to modify and progress therapeutic interventions, address functional mobility deficits, address ROM deficits, address strength deficits, analyze and address soft tissue restrictions, analyze and cue movement patterns, analyze and modify body mechanics/ergonomics, assess and modify postural abnormalities, address imbalance/dizziness and instruct in home and community integration to attain remaining goals. []  See Plan of Care  []  See progress note/recertification  []  See Discharge Summary         Progress towards goals / Updated goals:  Short Term Goals: To be accomplished in 2 treatments:   1. Patient will become proficient in their HEP and will be compliant in performing that program.    Evaluation: HEP established.    Reports daily compliance     Long Term Goals: To be accomplished in 10 treatments:   1. Patient's pain level will be 0-2/10 with activity in order to improve patient's ability to perform normal ADLs.     Evaluation: 3-7/10 with activity   Elevated today due to    2. Patient will increase FOTO score to >/= 51 points to indicate increased functional mobility.    Evaluation: 38 points    To be reassessed at end of POC  3.  Patient will demonstrate B hip abd/ext MMT 5/5 to perform ADL/IADLs  with greater ease    Evaluation:right hip abd/ext: 4-/5, left hip abd/ext: 4/5   Too soon to appreciate true strength changes  4. Patient will report ability to stand/sit >/= 60 minutes to increase ease/ind with performance of ADLs   Evaluation: Standing/Sitting tolerance <1 diane    PLAN  [x]  Upgrade activities as tolerated     [x]  Continue plan of care  []  Update interventions per flow sheet       []  Discharge due to:_  []  Other:_      Nancy Cowden, PT 11/9/2021  10:37 AM    Future Appointments   Date Time Provider Trisha Bundy   11/11/2021  1:30 PM Shirlene Jim [address] 11/16/2021  9:45 AM Catracho De La Cruz, PT [facility]   11/18/2021  8:15 AM Mal Solomon, DPT [facility]   11/22/2021  8:15 AM Missy Hyatt, PT [facility]   11/24/2021  2:15 PM Prabha Bowles, DPT [facility]   11/30/2021  8:15 AM Prabha Bowles, DPT [facility]   12/2/2021 10:30 AM Myrtle Briones, PTA [facility]   1/10/2022  [facility]   1/10/2022  9:45 AM MD Willie Patricio

## 2021-11-11 ENCOUNTER — HOSPITAL ENCOUNTER (OUTPATIENT)
Dept: PHYSICAL THERAPY | Age: 46
Discharge: HOME | End: 2021-11-11
Payer: OTHER GOVERNMENT

## 2021-11-11 PROCEDURE — 97112 NEUROMUSCULAR REEDUCATION: CPT

## 2021-11-11 PROCEDURE — 97530 THERAPEUTIC ACTIVITIES: CPT

## 2021-11-11 PROCEDURE — 97110 THERAPEUTIC EXERCISES: CPT

## 2021-11-11 NOTE — PROGRESS NOTES
PT DAILY TREATMENT NOTE     Patient Name: Kemar Garner  Date:2021  : 1975  [x]  Patient  Verified  Payor: [facility] Atkinson / Plan: [facility] CCN / Product Type: Federal Funded Programs /    In time:130  Out time:214  Total Treatment Time (min): 44  Visit #: 4 of 10    Treatment Area: Other low back pain [M54.59]  Dorsalgia [M54.9]    SUBJECTIVE  Pain Level (0-10 scale): 6  Any medication changes, allergies to medications, adverse drug reactions, diagnosis change, or new procedure performed?: [x] No    [] Yes (see summary sheet for update)  Subjective functional status/changes:   [] No changes reported  Pt reports she has a stiff neck today, but her back is starting to feel better. OBJECTIVE    10 min Therapeutic Exercise:  [x] See flow sheet :   Rationale: increase ROM and increase strength to improve the patients ability to perform ADLs    11 min Therapeutic Activity:  [x]  See flow sheet : squats, standing functional LE strength   Rationale: increase strength and improve coordination  to improve the patients ability to negotiate home and community      23 min Neuromuscular Re-education:  [x]  See flow sheet : core and glut virgil, balance   Rationale: increase strength, improve coordination, improve balance and increase proprioception  to improve the patients ability to tolerate sustained postures     With   [] TE   [] TA   [] neuro   [] other: Patient Education: [x] Review HEP    [] Progressed/Changed HEP based on:   [] positioning   [] body mechanics   [] transfers   [] heat/ice application    [] other:      Other Objective/Functional Measures: progressed exercises per flow sheet      Pain Level (0-10 scale) post treatment: 5    ASSESSMENT/Changes in Function: Pt tolerated progression of exercises with out increase in symptoms. Avoided core stabilization exercises that require UE today due to stiff neck report.  Pt was challenged with SL balance in hip flexion drive position, requiring max VC for knee and hip extension and to prevent thoracolumbar extension substitutions. Patient will continue to benefit from skilled PT services to modify and progress therapeutic interventions, address functional mobility deficits, address ROM deficits, address strength deficits, analyze and address soft tissue restrictions, analyze and cue movement patterns, analyze and modify body mechanics/ergonomics, assess and modify postural abnormalities, address imbalance/dizziness and instruct in home and community integration to attain remaining goals. []  See Plan of Care  []  See progress note/recertification  []  See Discharge Summary         Progress towards goals / Updated goals:  Short Term Goals: To be accomplished in 2 treatments:   1. Patient will become proficient in their HEP and will be compliant in performing that program.               Evaluation: HEP established.               MET     Long Term Goals: To be accomplished in 10 treatments:   1. Patient's pain level will be 0-2/10 with activity in order to improve patient's ability to perform normal ADLs.                Evaluation: 3-7/10 with activity              Elevated today     2. Patient will increase FOTO score to >/= 51 points to indicate increased functional mobility.               Evaluation: 38 points               To be reassessed at end of POC  3.  Patient will demonstrate B hip abd/ext MMT 5/5 to perform ADL/IADLs  with greater ease               Evaluation:right hip abd/ext: 4-/5, left hip abd/ext: 4/5              Too soon to appreciate true strength changes  4. Patient will report ability to stand/sit >/= 60 minutes to increase ease/ind with performance of ADLs              Evaluation: Standing/Sitting tolerance <1 diane       PLAN  [x]  Upgrade activities as tolerated     [x]  Continue plan of care  []  Update interventions per flow sheet       []  Discharge due to:_  []  Other:_      Chris Link, PT 11/11/2021  1:37 PM    Future Appointments   Date Time Provider Trisha Bundy   11/16/2021  9:45 AM Brooke Liriano, PT [facility]   11/18/2021  8:15 AM Adithya Wayne, DPT [facility]   11/22/2021  8:15 AM Fouzia Sellers, PT [facility]   11/24/2021  2:15 PM Adithya Wayne, DPT [facility]   11/30/2021  8:15 AM Adithya Wayne, DPT [facility]   12/2/2021 10:30 AM Marie Ramírez, PTA [facility]   1/10/2022  [facility]   1/10/2022  9:45 AM Kajal Rice MD [address] [facility]

## 2021-11-16 ENCOUNTER — APPOINTMENT (OUTPATIENT)
Dept: PHYSICAL THERAPY | Age: 46
End: 2021-11-16
Payer: OTHER GOVERNMENT

## 2021-11-18 ENCOUNTER — HOSPITAL ENCOUNTER (OUTPATIENT)
Dept: PHYSICAL THERAPY | Age: 46
Discharge: HOME | End: 2021-11-18
Payer: OTHER GOVERNMENT

## 2021-11-18 PROCEDURE — 97110 THERAPEUTIC EXERCISES: CPT

## 2021-11-18 PROCEDURE — 97112 NEUROMUSCULAR REEDUCATION: CPT

## 2021-11-18 PROCEDURE — 97530 THERAPEUTIC ACTIVITIES: CPT

## 2021-11-18 NOTE — PROGRESS NOTES
PT DAILY TREATMENT NOTE     Patient Name: Justen Lopez  Date:2021  : 1975  [x]  Patient  Verified  Payor: [facility] CCN / Plan: [facility] CCN / Product Type: Federal Funded Programs /    In time: 8:11A  Out time: 9:11A  Total Treatment Time (min): 60  Visit #: 4 of 10    Treatment Area:  Other low back pain [M54.59]  Other dorsalgia [M54.89]    SUBJECTIVE  Pain Level (0-10 scale): 7  Any medication changes, allergies to medications, adverse drug reactions, diagnosis change, or new procedure performed?: [x] No    [] Yes (see summary sheet for update)  Subjective functional status/changes:   [] No changes reported  Pt reports improved standing/sitting tolerance since beginning PT.    OBJECTIVE     Modality rationale: decrease pain and increase tissue extensibility to improve the patients ability to sleep comfortably   Min Type Additional Details    [] Estim:  []Unatt       []IFC  []Premod                        []Other:  []w/ice   []w/heat  Position:  Location:    [] Estim: []Att    []TENS instruct  []NMES                    []Other:  []w/US   []w/ice   []w/heat  Position:  Location:    []  Traction: [] Cervical       []Lumbar                       [] Prone          []Supine                       []Intermittent   []Continuous Lbs:  [] before manual  [] after manual    []  Ultrasound: []Continuous   [] Pulsed                           []1MHz   []3MHz Location:  W/cm2:    []  Iontophoresis with dexamethasone         Location: [] Take home patch   [] In clinic   10 []  Ice     [x]  heat  []  Ice massage  []  Laser   []  Anodyne Position: prone  Location: low back/glutes    []  Laser with stim  []  Other: Position:  Location:    []  Vasopneumatic Device  Pre-treatment girth:  Post-treatment girth:  Measured at (location):  Pressure:       [] lo [] med [] hi   Temperature: [] lo [] med [] hi     15 min Therapeutic Exercise:  [x] See flow sheet :   Rationale: increase ROM and increase strength to improve the patients ability to perform ADLs    10 min Therapeutic Activity:  [x]  See flow sheet : squats, standing functional LE strength   Rationale: increase strength and improve coordination  to improve the patients ability to negotiate home and community      25 min Neuromuscular Re-education:  [x]  See flow sheet : core and glut virgil, balance   Rationale: increase strength, improve coordination, improve balance and increase proprioception  to improve the patients ability to tolerate sustained postures     With   [x] TE   [x] TA   [x] neuro   [] other: Patient Education: [x] Review HEP    [] Progressed/Changed HEP based on:   [] positioning   [] body mechanics   [] transfers   [] heat/ice application    [] other:      Other Objective/Functional Measures: progressed exercises per flow sheet      Pain Level (0-10 scale) post treatment: 3    ASSESSMENT/Changes in Function:   Continued progression of ex progression through increased repetitions and resistance of activities performed over previous sessions. Pt w/ overall good tolerance/positive response through report of a reduction in pain levels post session. Patient will continue to benefit from skilled PT services to modify and progress therapeutic interventions, address functional mobility deficits, address ROM deficits, address strength deficits, analyze and address soft tissue restrictions, analyze and cue movement patterns, analyze and modify body mechanics/ergonomics, assess and modify postural abnormalities, address imbalance/dizziness and instruct in home and community integration to attain remaining goals. [x]  See Plan of Care  []  See progress note/recertification  []  See Discharge Summary         Progress towards goals / Updated goals:  Short Term Goals:  To be accomplished in 2 treatments:   1. Patient will become proficient in their HEP and will be compliant in performing that program.               Evaluation: HEP established.               MET     Long Term Goals: To be accomplished in 10 treatments:   1. Patient's pain level will be 0-2/10 with activity in order to improve patient's ability to perform normal ADLs.                Evaluation: 3-7/10 with activity        Current: Remains: 3-7/10   2. Patient will increase FOTO score to >/= 51 points to indicate increased functional mobility.               Evaluation: 38 points               To be reassessed at end of POC  3.  Patient will demonstrate B hip abd/ext MMT 5/5 to perform ADL/IADLs  with greater ease               Evaluation:right hip abd/ext: 4-/5, left hip abd/ext: 4/5              Too soon to appreciate true strength changes  4. Patient will report ability to stand/sit >/= 60 minutes to increase ease/ind with performance of ADLs              Evaluation: Standing/Sitting tolerance <1 minutes   Current: Progressing: standin min, sitting 30 min.        PLAN  [x]  Upgrade activities as tolerated     [x]  Continue plan of care  []  Update interventions per flow sheet       []  Discharge due to:_  []  Other:_      Eladio Beach DPT 2021  1:37 PM    Future Appointments   Date Time Provider Trisha Bundy   2021  8:15 AM [facility]   2021  2:15 PM Kajal Alston DPT [facility]   2021  7:00 AM Tracy Walls PT [facility] KALEB SCHED   2021  8:15 AM Kajal Alston DPT [facility]   2021 10:30 AM Yolis Mcgraw PTA [facility]   1/10/2022  9:30 AM EMILY Aldana KALEB SCHED   1/10/2022  9:45 AM MD Willie Kim   2022 11:40 AM Odell Ott, KIRSTEN Tapia

## 2021-11-22 ENCOUNTER — HOSPITAL ENCOUNTER (OUTPATIENT)
Dept: PHYSICAL THERAPY | Age: 46
Discharge: HOME | End: 2021-11-22
Payer: OTHER GOVERNMENT

## 2021-11-22 PROCEDURE — 97140 MANUAL THERAPY 1/> REGIONS: CPT

## 2021-11-22 PROCEDURE — 97530 THERAPEUTIC ACTIVITIES: CPT

## 2021-11-22 PROCEDURE — 97112 NEUROMUSCULAR REEDUCATION: CPT

## 2021-11-22 PROCEDURE — 97110 THERAPEUTIC EXERCISES: CPT

## 2021-11-22 NOTE — PROGRESS NOTES
PT DAILY TREATMENT NOTE     Patient Name: Marcela Cintron  Date:2021  : 1975  [x]  Patient  Verified  Payor: [facility] CCN / Plan: [facility] CCN / Product Type: Federal Funded Programs /    In [employer] time:907  Total Treatment Time (min): 51  Visit #: 6 of 10    Treatment Area: Other low back pain [M54.59]  Other dorsalgia [M54.89]    SUBJECTIVE  Pain Level (0-10 scale): 6  Any medication changes, allergies to medications, adverse drug reactions, diagnosis change, or new procedure performed?: [x] No    [] Yes (see summary sheet for update)  Subjective functional status/changes:   [] No changes reported  Pt reports she did a long drive recently and is having increased pain on the right side.      OBJECTIVE    Modality rationale: decrease pain and increase tissue extensibility to improve the patients ability to perform ADLs after therapy    Min Type Additional Details    [] Estim:  []Unatt       []IFC  []Premod                        []Other:  []w/ice   []w/heat  Position:  Location:    [] Estim: []Att    []TENS instruct  []NMES                    []Other:  []w/US   []w/ice   []w/heat  Position:  Location:    []  Traction: [] Cervical       []Lumbar                       [] Prone          []Supine                       []Intermittent   []Continuous Lbs:  [] before manual  [] after manual    []  Ultrasound: []Continuous   [] Pulsed                           []1MHz   []3MHz W/cm2:  Location:    []  Iontophoresis with dexamethasone         Location: [] Take home patch   [] In clinic   8 []  Ice     [x]  heat  []  Ice massage  []  Laser   []  Anodyne Position: prone  Location: lumbar    []  Laser with stim  []  Other:  Position:  Location:    []  Vasopneumatic Device    []  Right     []  Left  Pre-treatment girth:  Post-treatment girth:  Measured at (location):  Pressure:       [] lo [] med [] hi   Temperature: [] lo [] med [] hi   [] Skin assessment post-treatment:  [x]intact []redness- no adverse reaction    []redness  adverse reaction:     9 min Therapeutic Exercise:  [x] See flow sheet :   Rationale: increase ROM and increase strength to improve the patients ability to     10 min Therapeutic Activity:  [x]  See flow sheet : functional LE strength, hip hinging    Rationale: increase strength and improve coordination  to improve the patients ability to negotiate home and community      16 min Neuromuscular Re-education:  [x]  See flow sheet : core and scapular stabilization    Rationale: increase strength, improve coordination and increase proprioception  to improve the patients ability to tolerate sustained postures    8 min Manual Therapy:  MET and pelvic shotgun to correct left anterior rotation    The manual therapy interventions were performed at a separate and distinct time from the therapeutic activities interventions. Rationale: decrease pain to drive and perform ADLs          With   [] TE   [] TA   [] neuro   [] other: Patient Education: [x] Review HEP    [] Progressed/Changed HEP based on:   [] positioning   [] body mechanics   [] transfers   [] heat/ice application    [] other:      Other Objective/Functional Measures:   Left anterior innominate rotation      Pain Level (0-10 scale) post treatment: 0    ASSESSMENT/Changes in Function: pt left anterior innominate rotation was corrected today with MET and pelvic shotgun with reports of immediate reduction in pain. Cuing with RDL/hip hinges at wall and during birddogs to maintain neutral spine. Improves with verbal, visual, and tactile cuing.      Patient will continue to benefit from skilled PT services to modify and progress therapeutic interventions, address functional mobility deficits, address ROM deficits, address strength deficits, analyze and address soft tissue restrictions, analyze and cue movement patterns, analyze and modify body mechanics/ergonomics, assess and modify postural abnormalities, address imbalance/dizziness and instruct in home and community integration to attain remaining goals. []  See Plan of Care  []  See progress note/recertification  []  See Discharge Summary         Progress towards goals / Updated goals:  Short Term Goals: To be accomplished in 2 treatments:   1. Patient will become proficient in their HEP and will be compliant in performing that program.               Evaluation: HEP established.               MET     Long Term Goals: To be accomplished in 10 treatments:   1. Patient's pain level will be 0-2/10 with activity in order to improve patient's ability to perform normal ADLs.                [id number]: 2-1/05 with activity              ranges 0-6/10 today    2. Patient will increase FOTO score to >/= 51 points to indicate increased functional mobility.               Evaluation: 38 points               To be reassessed at end of POC  3. Patient will demonstrate B hip abd/ext MMT 5/5 to perform ADL/IADLs  with greater ease               Evaluation:right hip abd/ext: 4-/5, left hip abd/ext: 4/5              tolerating progression of exercises  4. Patient will report ability to stand/sit >/= 60 minutes to increase ease/ind with performance of ADLs              Evaluation: Standing/Sitting tolerance <1 minutes              Current: Progressing: standin min, sitting 30 min.      PLAN  [x]  Upgrade activities as tolerated     [x]  Continue plan of care  []  Update interventions per flow sheet       []  Discharge due to:_  []  Other:_      Yina Breen, PT 2021  8:25 AM    Future Appointments   Date Time Provider Trisha Bundy   2021  2:15 PM [facility]EL[facility]   2021  7:00 AM Ger Sarmiento PT [facility] KALEB SCHED   2021 10:30 AM [facility]   2021 10:30 AM FELICITA Gregory [facility]   2021 10:30 AM Burgess Efren PTA [facility]   1/10/2022  9:30 AM EMILY GUILLERMO [facility] KALEB SCHED   1/10/2022 9:45 AM MD Willie Huynh   1/17/2022 11:40 AM Beryl Del Rio, PT Willie

## 2021-11-24 ENCOUNTER — APPOINTMENT (OUTPATIENT)
Dept: PHYSICAL THERAPY | Age: 46
End: 2021-11-24
Payer: OTHER GOVERNMENT

## 2021-11-30 ENCOUNTER — APPOINTMENT (OUTPATIENT)
Dept: PHYSICAL THERAPY | Age: 46
End: 2021-11-30
Payer: OTHER GOVERNMENT

## 2021-12-02 ENCOUNTER — HOSPITAL ENCOUNTER (OUTPATIENT)
Dept: PHYSICAL THERAPY | Age: 46
Discharge: HOME | End: 2021-12-02
Payer: OTHER GOVERNMENT

## 2021-12-02 PROCEDURE — 97530 THERAPEUTIC ACTIVITIES: CPT

## 2021-12-02 PROCEDURE — 97140 MANUAL THERAPY 1/> REGIONS: CPT

## 2021-12-02 PROCEDURE — 97112 NEUROMUSCULAR REEDUCATION: CPT

## 2021-12-02 PROCEDURE — 97110 THERAPEUTIC EXERCISES: CPT

## 2021-12-02 NOTE — PROGRESS NOTES
[facility] [address]  [address] [facility] [address], [address] [phone] fax    Physician Update  [x] Progress Note  [] Discharge Summary    Patient name: Chantell Valadez Start of Care: 11/3/2021   Referral source: Jonatan Tom MD : 1975   Medical/Treatment Diagnosis: Other low back pain [M54.59]  Other dorsalgia [M54.89]  Payor: [facility] CCN / Plan: Jayde Givens / Product Type: Federal Funded Programs /  Onset Date:2 yrs ago          Prior Hospitalization: see medical history Provider#: [id number]   Medications: Verified on Patient Summary List    Comorbidities: Pt reports: Depression, Fibromyalgia, Latex Allergy  Prior Level of Function:Ind/pain free performing ADL/IADLs, self care tasks, recreational activities and standing/sitting prolonged durations    Visits from Start of Care: 7    Missed Visits: 1    Status at Evaluation/Last Progress Note:   Ms. Parvin Leon reports 70% improvement since beginning therapy stating she is taking less pain medication and is able to do more exercises. She continues to have fluctuating pain, morning stiffness, and needs to takes breaks to stretch every 2-3 hours. She has made progress with hip strength and standing tolerance but continues to be limited with sitting tolerance. She will benefit from continued skilled therapy to address remaining functional deficits.   Patient will continue to benefit from skilled PT services to modify and progress therapeutic interventions, address functional mobility deficits, address ROM deficits, address strength deficits, analyze and address soft tissue restrictions, analyze and cue movement patterns, analyze and modify body mechanics/ergonomics, assess and modify postural abnormalities, address imbalance/dizziness and instruct in home and community integration to attain remaining goals. Progress towards Goals:   Short Term Goals:  To be accomplished in 2 treatments:   1. Patient will become proficient in their HEP and will be compliant in performing that program.               Evaluation: HEP established.               MET     Long Term Goals: To be accomplished in 10 treatments:   1. Patient's pain level will be 0-2/10 with activity in order to improve patient's ability to perform normal ADLs.                Evaluation: 3-7/10 with activity              Ranges 0-7/10    2. Patient will increase FOTO score to >/= 51 points to indicate increased functional mobility.               Evaluation: 38 points               Regressed: 30 potentially due to overestimation at Eval  3. Patient will demonstrate B hip abd/ext MMT 5/5 to perform ADL/IADLs  with greater ease               Evaluation:right hip abd/ext: 4-/5, left hip abd/ext: 4/5              Progressing: right hip abd/ext 4/5, left hip abd/ext 4+/5  4. Patient will report ability to stand/sit >/= 60 minutes to increase ease/ind with performance of ADLs              Evaluation: Standing/Sitting tolerance <1 minutes              Progressing: standin-90 min, sitting 30 min.      Functional Gains: taking less pain medication, able to do more exercises  Functional Deficits: continued pain, morning stiffness, need to stop and stretch every 2-3 hours, shoulder and wrist pain with quadruped exercises  % improvement: 70%  Pain   Average: 3/10                  Best: 0/10                Worst: 7/10  Patient Goal: \"To get stronger muscles\"    Goals: to be achieved in 4 weeks:  1. Patient's pain level will be 0-2/10 with activity in order to improve patient's ability to perform normal ADLs.                P/N: Progressing: reports 0-7/10    2. Patient will increase FOTO score to >/= 51 points to indicate increased functional mobility.               P/N: Regressed: 30 potentially due to overestimation at Eval  3.  Patient will demonstrate B hip abd/ext MMT 5/5 to perform ADL/IADLs  with greater ease               P/N: Progressing: right hip abd/ext 4/5, left hip abd/ext 4+/5  4. Patient will report ability to sit >/= 60 minutes to increase ease/ind with performance of ADLs           P/N:  Progressing:  sitting 30 min.     ASSESSMENT/RECOMMENDATIONS:  [x]Continue therapy per initial plan/protocol at a frequency of  2x per week for 4 weeks  []Continue therapy with the following recommended changes:_____________________      _____________________________________________________________________  []Discontinue therapy progressing towards or have reached established goals  []Discontinue therapy due to lack of appreciable progress towards goals  []Discontinue therapy due to lack of attendance or compliance  []Await Physician's recommendations/decisions regarding therapy  []Other:________________________________________________________________    Thank you for this referral.   Darian Abarca DPT 12/2/2021 1:51 PM  NOTE TO PHYSICIAN:  [address] AND   FAX TO [facility]: ([phone]  If you are unable to process this request in 24 hours please contact our office: [phone]    []  I have read the above report and request that my patient continue as recommended. []  I have read the above report and request that my patient continue therapy with the following changes/special instructions:________________________________________  []I have read the above report and request that my patient be discharged from therapy.     [de-identified] Signature:____________Date:_________TIME:________     Liya Cruz MD  ** Signature, Date and Time must be completed for valid certification **

## 2021-12-02 NOTE — PROGRESS NOTES
PT DAILY TREATMENT NOTE     Patient Name: Sotero Sousa  Date:2021  : 1975  [x]  Patient  Verified  Payor: [facility] CCN / Plan: [facility] CCN / Product Type: Federal Funded Programs /    In [employer]  Total Treatment Time (min): 54  Visit #: 7 of 10    Treatment Area: Other low back pain [M54.59]  Other dorsalgia [M54.89]    SUBJECTIVE  Pain Level (0-10 scale): 5  Any medication changes, allergies to medications, adverse drug reactions, diagnosis change, or new procedure performed?: [x] No    [] Yes (see summary sheet for update)  Subjective functional status/changes:   [] No changes reported  Patient reports she feels stronger but is still having pain and wants to continue therapy.     OBJECTIVE    Modality rationale: decrease pain and increase tissue extensibility to improve the patients ability to perform ADLs   Min Type Additional Details    [] Estim:  []Unatt       []IFC  []Premod                        []Other:  []w/ice   []w/heat  Position:  Location:    [] Estim: []Att    []TENS instruct  []NMES                    []Other:  []w/US   []w/ice   []w/heat  Position:  Location:    []  Traction: [] Cervical       []Lumbar                       [] Prone          []Supine                       []Intermittent   []Continuous Lbs:  [] before manual  [] after manual    []  Ultrasound: []Continuous   [] Pulsed                           []1MHz   []3MHz W/cm2:  Location:    []  Iontophoresis with dexamethasone         Location: [] Take home patch   [] In clinic   10 []  Ice     [x]  heat  []  Ice massage  []  Laser   []  Anodyne Position: prone  Location: lumbar    []  Laser with stim  []  Other:  Position:  Location:    []  Vasopneumatic Device    []  Right     []  Left  Pre-treatment girth:  Post-treatment girth:  Measured at (location):  Pressure:       [] lo [] med [] hi   Temperature: [] lo [] med [] hi   [x] Skin assessment post-treatment:  [x]intact []redness- no adverse reaction    []redness  adverse reaction:     16 min Therapeutic Exercise:  [x] See flow sheet :   Rationale: increase ROM and increase strength to improve the patients ability to increase activity tolerance    10 min Therapeutic Activity:  [x]  See flow sheet : FOTO, goals assessment   Rationale: increase ROM, increase strength and improve coordination  to improve the patients ability to continue to progress in therapy     10 min Neuromuscular Re-education:  [x]  See flow sheet : core and hip stabilization, prone press ups   Rationale: increase strength, improve coordination, improve balance and increase proprioception  to improve the patients ability to tolerate sustained postures    8 min Manual Therapy:  MET and pelvic shotgun to correct right anterior rotation   The manual therapy interventions were performed at a separate and distinct time from the therapeutic activities interventions. Rationale: decrease pain, increase ROM, increase tissue extensibility and increase postural awareness to normalize lumbopelvic rhythm         With   [] TE   [] TA   [] neuro   [] other: Patient Education: [x] Review HEP    [] Progressed/Changed HEP based on:   [] positioning   [] body mechanics   [] transfers   [] heat/ice application    [] other:      Other Objective/Functional Measures: right anterior rotation    Pain Level (0-10 scale) post treatment: 3    ASSESSMENT/Changes in Function: Ms. Duc Peres reports 70% improvement since beginning therapy stating she is taking less pain medication and is able to do more exercises. She continues to have fluctuating pain, morning stiffness, and needs to takes breaks to stretch every 2-3 hours. She has made progress with hip strength and standing tolerance but continues to be limited with sitting tolerance. She will benefit from continued skilled therapy to address remaining functional deficits.     Patient will continue to benefit from skilled PT services to modify and progress therapeutic interventions, address functional mobility deficits, address ROM deficits, address strength deficits, analyze and address soft tissue restrictions, analyze and cue movement patterns, analyze and modify body mechanics/ergonomics, assess and modify postural abnormalities, address imbalance/dizziness and instruct in home and community integration to attain remaining goals. []  See Plan of Care  []  See progress note/recertification  []  See Discharge Summary         Progress towards goals / Updated goals:  Short Term Goals: To be accomplished in 2 treatments:   1. Patient will become proficient in their HEP and will be compliant in performing that program.               Evaluation: HEP established.               MET    Long Term Goals: To be accomplished in 10 treatments:   1. Patient's pain level will be 0-2/10 with activity in order to improve patient's ability to perform normal ADLs.                [id number]: 2- with activity              Ranges 0-7/10    2. Patient will increase FOTO score to >/= 51 points to indicate increased functional mobility.               Evaluation: 38 points               Regressed: 30 potentially due to overestimation at Eval  3.  Patient will demonstrate B hip abd/ext MMT 5/5 to perform ADL/IADLs  with greater ease               Evaluation:right hip abd/ext: 4-/5, left hip abd/ext: 4/5              Progressing: right hip abd/ext 4/5, left hip abd/ext 4+/5  4. Patient will report ability to stand/sit >/= 60 minutes to increase ease/ind with performance of ADLs              Evaluation: Standing/Sitting tolerance <1 minutes              Progressing: standin-90 min, sitting 30 min.     Functional Gains: taking less pain medication, able to do more exercises  Functional Deficits: continued pain, morning stiffness, need to stop and stretch every 2-3 hours, shoulder and wrist pain with quadruped exercises  % improvement: 70%  Pain   Average: 3/10       Best: 0/10     Worst: 7/10  Patient Goal: \"To get stronger muscles\"    PLAN  [x]  Upgrade activities as tolerated     [x]  Continue plan of care  []  Update interventions per flow sheet       []  Discharge due to:_  []  Other:_      Orvil Meals, PTA 12/2/2021  10:15 AM    Future Appointments   Date Time Provider Trisha Mini   12/2/2021 10:30 AM [facility]   12/7/2021 10:30 AM Adithya Wayne DPT [facility]   12/8/2021 11:40 AM Chidi Valdez PT [facility] SCHED   12/9/2021 10:30 AM Marie Ramírez PTA [facility]   12/21/2021  8:00 AM Kerri Vazquez PTA [facility] SCHED   1/10/2022  9:30 AM EMILY Ng Begun Munger SCHED   1/10/2022  9:45 AM MD Willie Villasenor   1/17/2022 11:40 AM Marques Lopez, [address] [place] [address]

## 2021-12-03 ENCOUNTER — HOSPITAL ENCOUNTER (OUTPATIENT)
Dept: PHYSICAL THERAPY | Age: 46
Discharge: HOME | End: 2021-12-03
Payer: OTHER GOVERNMENT

## 2021-12-03 PROCEDURE — 97110 THERAPEUTIC EXERCISES: CPT

## 2021-12-03 PROCEDURE — 97530 THERAPEUTIC ACTIVITIES: CPT

## 2021-12-03 PROCEDURE — 97112 NEUROMUSCULAR REEDUCATION: CPT

## 2021-12-03 NOTE — PROGRESS NOTES
PT DAILY TREATMENT NOTE     Patient Name: Drew Gary  Date:12/3/2021  : 1975  [x]  Patient  Verified  Payor: Celsa Dick / Plan: [facility] CCN / Product Type: Federal Funded Programs /    In time:130  Out time:219  Total Treatment Time (min): 49  Visit #: 1 of 8    Treatment Area: Other low back pain [M54.59]  Other dorsalgia [M54.89]    SUBJECTIVE  Pain Level (0-10 scale): 6  Any medication changes, allergies to medications, adverse drug reactions, diagnosis change, or new procedure performed?: [x] No    [] Yes (see summary sheet for update)  Subjective functional status/changes:   [] No changes reported  Patient reports she was feeling good yesterday so she raked the leaves and mowed the lawn, now she has increased pain.     OBJECTIVE    Modality rationale: decrease pain and increase tissue extensibility to improve the patients ability to perform ADLs   Min Type Additional Details    [] Estim:  []Unatt       []IFC  []Premod                        []Other:  []w/ice   []w/heat  Position:  Location:    [] Estim: []Att    []TENS instruct  []NMES                    []Other:  []w/US   []w/ice   []w/heat  Position:  Location:    []  Traction: [] Cervical       []Lumbar                       [] Prone          []Supine                       []Intermittent   []Continuous Lbs:  [] before manual  [] after manual    []  Ultrasound: []Continuous   [] Pulsed                           []1MHz   []3MHz W/cm2:  Location:    []  Iontophoresis with dexamethasone         Location: [] Take home patch   [] In clinic   7 [x]  Ice     []  heat  []  Ice massage  []  Laser   []  Anodyne Position: prone  Location: lumbar    []  Laser with stim  []  Other:  Position:  Location:    []  Vasopneumatic Device    []  Right     []  Left  Pre-treatment girth:  Post-treatment girth:  Measured at (location):  Pressure:       [] lo [] med [] hi   Temperature: [] lo [] med [] hi   [x] Skin assessment post-treatment: [x]intact []redness- no adverse reaction    []redness  adverse reaction:     10 min Therapeutic Exercise:  [x] See flow sheet :   Rationale: increase ROM and increase strength to improve the patients ability to increase activity tolerance    8 min Therapeutic Activity:  [x]  See flow sheet : squatting mechanics   Rationale: increase ROM, increase strength and improve coordination  to improve the patients ability to perform heavy ADLs     24 min Neuromuscular Re-education:  [x]  See flow sheet : core/hip stabilization, prone press ups   Rationale: increase strength, improve coordination, improve balance and increase proprioception  to improve the patients ability to tolerate sustained postures          With   [] TE   [x] TA   [] neuro   [] other: Patient Education: [x] Review HEP    [] Progressed/Changed HEP based on:   [] positioning   [] body mechanics   [] transfers   [] heat/ice application    [x] other: activity modification     Other Objective/Functional Measures: progressed per flow sheet     Pain Level (0-10 scale) post treatment: 0    ASSESSMENT/Changes in Function: Patient reported increased pain following raking leaves and mowing after her session yesterday. Educated patient on activity modification to avoid exacerbation of pain. She demonstrated level pelvic alignment and was able to progress core stability exercises without increase of pain. She reported no pain following session. Patient will continue to benefit from skilled PT services to modify and progress therapeutic interventions, address functional mobility deficits, address ROM deficits, address strength deficits, analyze and address soft tissue restrictions, analyze and cue movement patterns, analyze and modify body mechanics/ergonomics, assess and modify postural abnormalities, address imbalance/dizziness and instruct in home and community integration to attain remaining goals.      []  See Plan of Care  []  See progress note/recertification  []  See Discharge Summary         Progress towards goals / Updated goals:  Goals: to be achieved in 4 weeks:  1. Patient's pain level will be 0-2/10 with activity in order to improve patient's ability to perform normal ADLs.                P/N: Progressing: reports 0-7/10    2. Patient will increase FOTO score to >/= 51 points to indicate increased functional mobility.               P/N: Regressed: 30 potentially due to overestimation at Eval  3.  Patient will demonstrate B hip abd/ext MMT 5/5 to perform ADL/IADLs  with greater ease               P/N: Progressing: right hip abd/ext 4/5, left hip abd/ext 4+/5  4. Patient will report ability to sit >/= 60 minutes to increase ease/ind with performance of ADLs           P/N:  Progressing:  sitting 30 min.     PLAN  [x]  Upgrade activities as tolerated     [x]  Continue plan of care  []  Update interventions per flow sheet       []  Discharge due to:_  []  Other:_      Bridget Ayala PTA 12/3/2021  1:25 PM    Future Appointments   Date Time Provider Trisha Bundy   12/3/2021  1:30 [facility]   12/7/2021 10:30 AM Gina Grove DPT [facility]   12/8/2021 11:40 AM Johny Ramírez, PT [facility] SCHED   12/9/2021 10:30 AM Charisse Orr PTA [facility]   12/14/2021  9:00 AM [facility]   12/16/2021  9:00 AM [facility]   12/21/2021  8:00 AM Yumiko Kitchen PTA [facility] SCHED   1/10/2022  9:30 AM [facility] Dahlia Overcast [facility] SCHED   1/10/2022  9:45 AM Wyatt Gomez MD [address]   1/17/2022 11:40 AM Cayla Lopez, PT [address]

## 2021-12-07 ENCOUNTER — HOSPITAL ENCOUNTER (OUTPATIENT)
Dept: PHYSICAL THERAPY | Age: 46
Discharge: HOME | End: 2021-12-07
Payer: OTHER GOVERNMENT

## 2021-12-07 PROCEDURE — 97110 THERAPEUTIC EXERCISES: CPT

## 2021-12-07 PROCEDURE — 97530 THERAPEUTIC ACTIVITIES: CPT

## 2021-12-07 PROCEDURE — 97112 NEUROMUSCULAR REEDUCATION: CPT

## 2021-12-07 NOTE — PROGRESS NOTES
PT DAILY TREATMENT NOTE     Patient Name: Dorothea Cota  Date:2021  : 1975  [x]  Patient  Verified  Payor: [facility] CCN / [facility] / Product Type: Federal Funded Programs /    In [facility] time 11:22A  Total Treatment Time (min): 50  Visit #: 2 of 8    Treatment Area: Other low back pain [M54.59]  Other dorsalgia [M54.89]    SUBJECTIVE  Pain Level (0-10 scale): 4  Any medication changes, allergies to medications, adverse drug reactions, diagnosis change, or new procedure performed?: [x] No    [] Yes (see summary sheet for update)  Subjective functional status/changes:   [] No changes reported  Patient reports continued improvements in overall pain intensity, however upon feeling better pt increases activity level outside of session( I.e mowing yard for 2 hrs) in which she experiences flare up after.      OBJECTIVE    Modality rationale: decrease pain and increase tissue extensibility to improve the patients ability to perform ADLs   Min Type Additional Details    [] Estim:  []Unatt       []IFC  []Premod                        []Other:  []w/ice   []w/heat  Position:  Location:    [] Estim: []Att    []TENS instruct  []NMES                    []Other:  []w/US   []w/ice   []w/heat  Position:  Location:    []  Traction: [] Cervical       []Lumbar                       [] Prone          []Supine                       []Intermittent   []Continuous Lbs:  [] before manual  [] after manual    []  Ultrasound: []Continuous   [] Pulsed                           []1MHz   []3MHz W/cm2:  Location:    []  Iontophoresis with dexamethasone         Location: [] Take home patch   [] In clinic   8 []  Ice     [x]  heat  []  Ice massage  []  Laser   []  Anodyne Position: prone  Location: lumbar    []  Laser with stim  []  Other:  Position:  Location:    []  Vasopneumatic Device    []  Right     []  Left  Pre-treatment girth:  Post-treatment girth:  Measured at (location):  Pressure: [] lo [] med [] hi   Temperature: [] lo [] med [] hi   [x] Skin assessment post-treatment:  [x]intact []redness- no adverse reaction    []redness  adverse reaction:     10 min Therapeutic Exercise:  [x] See flow sheet :   Rationale: increase ROM and increase strength to improve the patients ability to increase activity tolerance    8 min Therapeutic Activity:  [x]  See flow sheet : squatting mechanics   Rationale: increase ROM, increase strength and improve coordination  to improve the patients ability to perform heavy ADLs     24 min Neuromuscular Re-education:  [x]  See flow sheet : core/hip stabilization, prone press ups   Rationale: increase strength, improve coordination, improve balance and increase proprioception  to improve the patients ability to tolerate sustained postures          With   [x] TE   [x] TA   [x] neuro   [] other: Patient Education: [x] Review HEP    [] Progressed/Changed HEP based on:   [] positioning   [] body mechanics   [] transfers   [] heat/ice application    [x] other: pt ed on appropriate activity modifications/strategies on return to activities to reduce symptom flare up      Other Objective/Functional Measures: progressed per flow sheet     Pain Level (0-10 scale) post treatment: 0    ASSESSMENT/Changes in Function:   Continued progression of core stability activites in standing and modified plank position(s), in addition to isolated LE strengthening ex - muscular fatigue reported/demonstrated w/ pt requiring intermittent cuing to improve technique/form and optimize TA act, however completes w/o an increase in symptoms. Leaves session with report of no pain.     Patient will continue to benefit from skilled PT services to modify and progress therapeutic interventions, address functional mobility deficits, address ROM deficits, address strength deficits, analyze and address soft tissue restrictions, analyze and cue movement patterns, analyze and modify body mechanics/ergonomics, assess and modify postural abnormalities, address imbalance/dizziness and instruct in home and community integration to attain remaining goals. []  See Plan of Care  []  See progress note/recertification  []  See Discharge Summary         Progress towards goals / Updated goals:  Goals: to be achieved in 4 weeks:  1. Patient's pain level will be 0-2/10 with activity in order to improve patient's ability to perform normal ADLs.                P/N: Progressing: reports 0-7/10    2. Patient will increase FOTO score to >/= 51 points to indicate increased functional mobility.               P/N: Regressed: 30 potentially due to overestimation at Eval  3.  Patient will demonstrate B hip abd/ext MMT 5/5 to perform ADL/IADLs  with greater ease               P/N: Progressing: right hip abd/ext 4/5, left hip abd/ext 4+/5  4. Patient will report ability to sit >/= 60 minutes to increase ease/ind with performance of ADLs           P/N:  Progressing:  sitting 30 min.     PLAN  [x]  Upgrade activities as tolerated     [x]  Continue plan of care  []  Update interventions per flow sheet       []  Discharge due to:_  []  Other:_      Wojciech Freeman DPT 12/7/2021  1:25 PM    Future Appointments   Date Time Provider Trisha Bundy   12/8/2021 11:40 AM Tacos Carranza PT [facility] SCHED   12/9/2021 10:30 AM Jerad Hoover PTA [facility]   12/14/2021  9:00 AM Doc Zeked, PTA [facility]   12/16/2021  9:00 AM Doc Zeked, PTA [facility]   12/21/2021  8:00 AM Rachael Maria PTA [facility] SCHED   1/10/2022  9:30 AM EMILY Hernandez [facility] [place] SCHED   1/10/2022  9:45 AM Shandra Carrasco MD [id number] [facility]   1/17/2022 11:40 AM Edgar Lopez, [address] [place] [address]

## 2021-12-09 ENCOUNTER — HOSPITAL ENCOUNTER (OUTPATIENT)
Dept: PHYSICAL THERAPY | Age: 46
Discharge: HOME | End: 2021-12-09
Payer: OTHER GOVERNMENT

## 2021-12-09 PROCEDURE — 97112 NEUROMUSCULAR REEDUCATION: CPT

## 2021-12-09 PROCEDURE — 97110 THERAPEUTIC EXERCISES: CPT

## 2021-12-09 PROCEDURE — 97530 THERAPEUTIC ACTIVITIES: CPT

## 2021-12-09 NOTE — PROGRESS NOTES
PT DAILY TREATMENT NOTE     Patient Name: Sotero Sousa  Date:2021  : 1975  [x]  Patient  Verified  Payor: [facility] / Plan: [facility] CCN / Product Type: "[employer] /    In time: 10:35 Out time 11:25  Total Treatment Time (min): 50  Visit #: 3 of 8    Treatment Area: Other low back pain [M54.59]  Other dorsalgia [M54.89]    SUBJECTIVE  Pain Level (0-10 scale): 3  Any medication changes, allergies to medications, adverse drug reactions, diagnosis change, or new procedure performed?: [x] No    [] Yes (see summary sheet for update)  Subjective functional status/changes:   [] No changes reported  \"Doing well today. LBP worsens with standing/walking > 30 minutes. My goal is to get back to jogging. Performing HEP 2x a day. \"    OBJECTIVE    Modality rationale: decrease pain and increase tissue extensibility to improve the patients ability to perform ADLs   Min Type Additional Details    [] Estim:  []Unatt       []IFC  []Premod                        []Other:  []w/ice   []w/heat  Position:  Location:    [] Estim: []Att    []TENS instruct  []NMES                    []Other:  []w/US   []w/ice   []w/heat  Position:  Location:    []  Traction: [] Cervical       []Lumbar                       [] Prone          []Supine                       []Intermittent   []Continuous Lbs:  [] before manual  [] after manual    []  Ultrasound: []Continuous   [] Pulsed                           []1MHz   []3MHz W/cm2:  Location:    []  Iontophoresis with dexamethasone         Location: [] Take home patch   [] In clinic   8 []  Ice     [x]  heat  []  Ice massage  []  Laser   []  Anodyne Position: prone  Location: lumbar    []  Laser with stim  []  Other:  Position:  Location:    []  Vasopneumatic Device    []  Right     []  Left  Pre-treatment girth:  Post-treatment girth:  Measured at (location):  Pressure:       [] lo [] med [] hi   Temperature: [] lo [] med [] hi   [x] Skin assessment post-treatment:  [x]intact []redness- no adverse reaction    []redness  adverse reaction:     10 min Therapeutic Exercise:  [x] See flow sheet :   Rationale: increase ROM and increase strength to improve the patients ability to increase activity tolerance    8 min Therapeutic Activity:  [x]  See flow sheet : squatting mechanics   Rationale: increase ROM, increase strength and improve coordination  to improve the patients ability to perform heavy ADLs     24 min Neuromuscular Re-education:  [x]  See flow sheet : core/hip stabilization, prone press ups   Rationale: increase strength, improve coordination, improve balance and increase proprioception  to improve the patients ability to tolerate sustained postures          With   [x] TE   [x] TA   [x] neuro   [] other: Patient Education: [x] Review HEP    [] Progressed/Changed HEP based on:   [] positioning   [] body mechanics   [] transfers   [] heat/ice application    [] other:      Other Objective/Functional Measures: Added SL clams and hip abd. Pain Level (0-10 scale) post treatment: 0    ASSESSMENT/Changes in Function: L/S pain and symptoms cont to increase with prolonged standing/walking > 30 minutes. No sx exacerbation with today's session. Quick to fatigue with glut strengthening. Cueing to reduce trunk rotation during bird/dogs and correct form during mod plank. Patient will continue to benefit from skilled PT services to modify and progress therapeutic interventions, address functional mobility deficits, address ROM deficits, address strength deficits, analyze and address soft tissue restrictions, analyze and cue movement patterns, analyze and modify body mechanics/ergonomics, assess and modify postural abnormalities, address imbalance/dizziness and instruct in home and community integration to attain remaining goals.      []  See Plan of Care  []  See progress note/recertification  []  See Discharge Summary         Progress towards goals / Updated goals:  Goals: to be achieved in 4 weeks:  1. Patient's pain level will be 0-2/10 with activity in order to improve patient's ability to perform normal ADLs.                P/N: Progressing: reports 0-7/10     2. Patient will increase FOTO score to >/= 51 points to indicate increased functional mobility.               P/N: Regressed: 30 potentially due to overestimation at Eval  3.  Patient will demonstrate B hip abd/ext MMT 5/5 to perform ADL/IADLs  with greater ease               P/N: Progressing: right hip abd/ext 4/5, left hip abd/ext 4+/5  4. Patient will report ability to sit >/= 60 minutes to increase ease/ind with performance of ADLs           P/N:  Progressing:  sitting 30 min.     PLAN  [x]  Upgrade activities as tolerated     [x]  Continue plan of care  []  Update interventions per flow sheet       []  Discharge due to:_  []  Other:_      Annamary Curling, PTA 12/9/2021  1:25 PM    Future Appointments   Date Time Provider Trisha Bundy   12/9/2021 10:30 AM [facility] [address] [facility]   12/14/2021  9:00 AM Phyllis Yañez PTA [facility]   12/16/2021  9:00 [facility]   12/21/2021  8:00 AM Pushpa Mcmahan PTA [facility] SCHED   1/17/2022 11:40 AM Anai Delgadillo PT [facility] KAL[facility] SCHED   6/20/2022 10:00 AM [facility] Eze Pierson KALEB SCHED   6/20/2022 10:15 AM Carina Driver MD [facility]

## 2021-12-14 ENCOUNTER — HOSPITAL ENCOUNTER (OUTPATIENT)
Dept: PHYSICAL THERAPY | Age: 46
Discharge: HOME | End: 2021-12-14
Payer: OTHER GOVERNMENT

## 2021-12-14 PROCEDURE — 97530 THERAPEUTIC ACTIVITIES: CPT

## 2021-12-14 NOTE — PROGRESS NOTES
Physical Therapy Discharge Instructions      [facility] [place] [address]   [address] [facility]  [place], [address]  [phone] fax      Patient: Genny Davies  : 1975      Continue Home Exercise Program 2 times per day for 4-6 weeks, then decrease to 4-5 times per week      Continue with    [x] Ice  as needed 1-2 times per day     [x] Heat           Follow up with MD:     [] Upon completion of therapy     [x] As needed      Recommendations:     [x]   Return to activity with home program    []   Return to activity with the following modifications:       []Post Rehab Program    []Join Independent aquatic program     []Return to/join local gym        Additional Comments: Please let us know if you have any questions!           Shiela Domínguez, MIKHAIL 2021 9:24 AM

## 2021-12-14 NOTE — PROGRESS NOTES
PT DAILY TREATMENT NOTE     Patient Name: Maico Jurado  COZC:  : 1975  [x]  Patient  Verified  Payor: [facility] CCN / [facility] / Product Type: Federal Funded Programs /    In time:901  Out time:931  Total Treatment Time (min): 30  Visit #: 4 of 8    Treatment Area: Other low back pain [M54.59]  Other dorsalgia [M54.89]    SUBJECTIVE  Pain Level (0-10 scale): 3  Any medication changes, allergies to medications, adverse drug reactions, diagnosis change, or new procedure performed?: [x] No    [] Yes (see summary sheet for update)  Subjective functional status/changes:   [] No changes reported  Patient reports her back is feeling better and would like to make today her last day. OBJECTIVE    30 min Therapeutic Activity:  [x]  See flow sheet : FOTO, goals assessment, final HEP instruction   Rationale: increase ROM, increase strength and improve coordination  to improve the patients ability to manage symptoms independently with HEP     With   [] TE   [] TA   [] neuro   [] other: Patient Education: [x] Review HEP    [] Progressed/Changed HEP based on:   [] positioning   [] body mechanics   [] transfers   [] heat/ice application    [] other:      Other Objective/Functional Measures: assessed goals     Pain Level (0-10 scale) post treatment: 3    ASSESSMENT/Changes in Function: Ms. Carmen Saha reports 80% overall improvement since beginning therapy stating she is taking less meds, feels stronger, and is able to sit/stand/walk for longer periods of time. She has made progress with bilateral hip strength and activity tolerance, and remains motivated with completing HEP 2x/day. Provided final HEP with instruction and TB with patient demonstrating and verbalizing understanding. At this time, she feels she can independently manage symptoms and will be discharged from therapy.     Patient will continue to benefit from skilled PT services to modify and progress therapeutic interventions, address functional mobility deficits, address ROM deficits, address strength deficits, analyze and address soft tissue restrictions, analyze and cue movement patterns, analyze and modify body mechanics/ergonomics, assess and modify postural abnormalities, address imbalance/dizziness and instruct in home and community integration to attain remaining goals. []  See Plan of Care  []  See progress note/recertification  []  See Discharge Summary         Progress towards goals / Updated goals:  Goals: to be achieved in 4 weeks:  1. Patient's pain level will be 0-2/10 with activity in order to improve patient's ability to perform normal ADLs.                P/N: Progressing: reports 0-7/10   Progressin-4/10     2. Patient will increase FOTO score to >/= 51 points to indicate increased functional mobility.               P/N: Regressed: 30 potentially due to overestimation at Eval   Regressed: 29 potentially due to limitations related to knee pain  3. Patient will demonstrate B hip abd/ext MMT 5/5 to perform ADL/IADLs  with greater ease               P/N: Progressing: right hip abd/ext 4/5, left hip abd/ext 4+/5   Progressing: right hip abd/ext 4+/5, left hip abd/ext 5-/5  4. Patient will report ability to sit >/= 60 minutes to increase ease/ind with performance of ADLs           P/N:  Progressing:  sitting 30 min.    Progressin-40 min     Functional Gains: taking less meds, less inflammation, feel stronger, standing tolerance 45-60 min, pain is more manageable  Functional Deficits: going up and down stairs (more of an issue because of knee pain)  % improvement: 80%  Pain   Average: 3/10       Best: 0/10     Worst: 4/10  Patient Goal: \"Continue with the exercises to strengthen my core\"    PLAN  []  Upgrade activities as tolerated     []  Continue plan of care  []  Update interventions per flow sheet       [x]  Discharge due to:_patient is progressing toward LTG  []  Other:_      Robert Logan, PTA 12/14/2021  8:27 AM    Future Appointments   Date Time Provider Trisha Hallisti   12/14/2021  9:00 [facility]   12/16/2021  9:00 AM [facility]   12/21/2021  8:00 AM Cherelle Lomeli PTA [facility] KALEB SCHED   1/17/2022 11:40 AM Fela Doe PT [facility] KALEB SCHED   6/20/2022 10:00 AM [facility] Collette Tena [facility] KALEB SCHED   6/20/2022 10:15 AM Avi Shipman MD [address]

## 2021-12-15 NOTE — PROGRESS NOTES
[facility] [address], [address] [phone] fax    Discharge Summary  Patient name: Maico Nearing Start of Care: 11/3/2021   Referral source: Dilan Hsu MD : 1975                Medical Diagnosis: Other low back pain [M54.59]  Dorsalgia [M54.9]  Payor: [facility] CCN / Plan: [employer] / Product Type: [employer] Programs /     Onset Date:2 yrs ago                Treatment Diagnosis: Low Back Pain    Prior Hospitalization: see medical history Provider#: [id number]   Medications: Verified on Patient summary List    Comorbidities: Pt reports: Depression, Fibromyalgia, Latex Allergy   Prior Level of Function: Ind/pain free performing ADL/IADLs, self care tasks, recreational activities and standing/sitting prolonged durations                             Visits from Start of Care: 11    Missed Visits: 0    Reporting Period : 12/3/21 to 21    1. Patient's pain level will be 0-2/10 with activity in order to improve patient's ability to perform normal ADLs.                P/N: Progressing: reports 0-7/10              Progressed well, not met: 0-4/10     2. Patient will increase FOTO score to >/= 51 points to indicate increased functional mobility.               P/N: Regressed: 30 potentially due to overestimation at Eval              Regressed: 29 potentially due to limitations related to knee pain  3. Patient will demonstrate B hip abd/ext MMT 5/5 to perform ADL/IADLs  with greater ease               P/N: Progressing: right hip abd/ext 4/5, left hip abd/ext 4+/5              Progressing, not met: right hip abd/ext 4+/5, left hip abd/ext 5-/5  4. Patient will report ability to sit >/= 60 minutes to increase ease/ind with performance of ADLs           P/N:  Progressing:  sitting 30 min.               Progressing, not met: 30-40 min      Functional Gains: taking less meds, less inflammation, feel stronger, standing tolerance 45-60 min, pain is more manageable  Functional Deficits: going up and down stairs (more of an issue because of knee pain)  % improvement: 80%  Pain   Average: 3/10                  Best: 0/10                Worst: 4/10  Patient Goal: \"Continue with the exercises to strengthen my core\"    Assessment/Summary of care: Ms. Ronal Duverney reports 80% overall improvement since beginning therapy stating she is taking less meds, feels stronger, and is able to sit/stand/walk for longer periods of time. She has made progress with bilateral hip strength and activity tolerance, and remains motivated with completing HEP 2x/day. Provided final HEP with instruction and TB with patient demonstrating and verbalizing understanding. At this time, she feels she can independently manage symptoms and will be discharged from therapy.     RECOMMENDATIONS:  [x]Discontinue therapy: [x]Patient has reached or is progressing toward set goals      []Patient is non-compliant or has abdicated      []Due to lack of appreciable progress towards set [address] [place], PT 12/15/2021 3:34 PM

## 2021-12-16 ENCOUNTER — APPOINTMENT (OUTPATIENT)
Dept: PHYSICAL THERAPY | Age: 46
End: 2021-12-16
Payer: OTHER GOVERNMENT

## 2022-02-04 ENCOUNTER — HOSPITAL ENCOUNTER (OUTPATIENT)
Dept: PHYSICAL THERAPY | Age: 47
Discharge: HOME | End: 2022-02-04
Payer: OTHER GOVERNMENT

## 2022-02-04 PROCEDURE — 97110 THERAPEUTIC EXERCISES: CPT

## 2022-02-04 PROCEDURE — 97161 PT EVAL LOW COMPLEX 20 MIN: CPT

## 2022-02-04 NOTE — PROGRESS NOTES
[facility], [address] [place] [address]  [phone] fax  Plan of Care/ Statement of Necessity for Physical Therapy Services     Patient name: Red Schumacher Start of Care: 2022   Referral source: Janis Shipman MD : 1975    Medical Diagnosis: Pain in right knee [M25.561]  Pain in left knee [M25.562]  Payor: [facility] CCN / Plan: [facility] CCN / Product Type: [employer] /  Onset Date:2021    Treatment Diagnosis: Bilateral knee pain   Prior Hospitalization: see medical history Provider#: [id number]   Medications: Verified on Patient summary List    Comorbidities: Two c-sections 21 years ago; steroid shot in the back for pain about a year ago, restless leg syndrome (sleep dysfunction), incontinence, GI disease, anxiety or panic disorder, back pain, depression, headaches, kidney bladder, prostate or urination problems,    Prior Level of Function: Independent with ADLs    The Plan of Care and following information is based on the information from the initial evaluation. Assessment/ key information: Pt is a 55 y.o. female who presents with c/o chronic knee pain progressively getting worse over the past few years after retiring from the [employer]. Functional deficits include: step to pattern with stair negotiation, walking tolerance 15 minutes and standing tolerance 15 minutes. Upon exam, Pt exhibited decreased strength and balance. Pt would benefit from skilled PT to address above deficits to improve Pt's function and ability to return to PLOF and ease of performing ADLs.     Evaluation Complexity History MEDIUM  Complexity : 1-2 comorbidities / personal factors will impact the outcome/ POC ; Examination MEDIUM Complexity : 3 Standardized tests and measures addressing body structure, function, activity limitation and / or participation in recreation  ;Presentation MEDIUM Complexity : Evolving with changing characteristics  ; Clinical Decision Making MEDIUM Complexity : FOTO score of 26-74  Overall Complexity Rating: MEDIUM  Problem List: pain affecting function, decrease ROM, decrease strength, impaired gait/ balance, decrease ADL/ functional abilitiies, decrease activity tolerance, decrease flexibility/ joint mobility and decrease transfer abilities   Treatment Plan may include any combination of the following: Therapeutic exercise, Therapeutic activities, Neuromuscular re-education, Physical agent/modality, Gait/balance training, Manual therapy, Patient education and Self Care training  Patient / Family readiness to learn indicated by: asking questions, trying to perform skills and interest  Persons(s) to be included in education: patient (P)  Barriers to Learning/Limitations: None  Patient Goal (s): \"I want to increase my leg strength and balance. \"  Patient Self Reported Health Status: fair  Rehabilitation Potential: good    Short Term Goals: To be accomplished in 1 week  - Goal: Pt to be compliant with initial HEP to improve ease of performing ADLs and household chores  Status at last note/certification: Established and reviewed with Pt  Long Term Goals: To be accomplished in 10 treatments  - Goal: Pt to improve sit to stands to 16x to show increase in LE functional strength  Status at last note/certification: 28B without UE support  - Goal: Patient will ascend and descend 12 steps with reciprocal pattern to increase ease of entry into home  Status at last note/certification: 12 steps with step to pattern  - Goal: Patient will tolerate ambulation time of 30 min without symptom exacerbation to facilitate improved community ambulation. Status at last note/certification: 15 min  - Goal: Patient will increase FOTO score to at least 51 pts to demonstrate increased functional mobility.   Status at last note/certification: FOTO 34 pts   -Goal: Patient will improve SLS to 30 seconds bilaterally to decrease risk for falls.  Status at last note/certification: left 11 seconds; right 12 seconds  -Goal: Patient will be able to lift and carry 15-20# to carry groceries and perform household chores and tasks. Status at last note/certification: not tested    Frequency / Duration: Patient to be seen 2 times per week for 5 weeks. Patient/ Caregiver education and instruction: Diagnosis, prognosis, self care, activity modification and exercises   [x]  Plan of care has been reviewed with AMAN Dixon 2/4/2022 12:50 PM  _____________________________________________________________________  I certify that the above Therapy Services are being furnished while the patient is under my care. I agree with the treatment plan and certify that this therapy is necessary.     [de-identified] Signature:____________Date:_________TIME:________     Kalpana Islas MD  ** Signature, Date and Time must be completed for valid certification **    Please sign and return to [facility], [address] [place] [address]  [phone] fax

## 2022-02-04 NOTE — PROGRESS NOTES
PT DAILY TREATMENT NOTE/KNEE EVAL     Patient Name: Misty Hinton  Date:2022  : 1975  [x]  Patient  Verified  Payor: [facility] CCN / Plan: [facility] CCN / Product Type: Federal Funded Programs /    In Mariee [employer] Minor time:1150  Total Treatment Time (min): 34  Visit #: 1 of 10    Medicare/BCBS Only   Total Timed Codes (min):   1:1 Treatment Time:       Treatment Area: Pain in right knee [M25.561]  Pain in left knee [M25.562]    SUBJECTIVE  Pain Level (0-10 scale): current/best: 5; worst 7 when locked up;   [x]constant []intermittent []improving []worsening []no change since onset    Any medication changes, allergies to medications, adverse drug reactions, diagnosis change, or new procedure performed?: [x] No    [] Yes (see summary sheet for update)  Subjective functional status/changes:     PLOF: Independent with ADLs  Limitations to PLOF: stair negotiation step to pattern; walking dog 15 minutes most days when it's not too cold or hot; standing tolerance 15 minutes;   Mechanism of Injury: I would like some pain management stretching. I can't work because my knee and back. I haven't had any falls and I haven't fallen but I feel unstable. I want to avoid surgery. No STEPHANIA but wear and tear on my body. Bottom of the foot burns when walking for long periods. Current symptoms/Complaints: Knee pain  Previous Treatment/Compliance: PT for shoulders, back, and neck  PMHx/Surgical Hx: Two c-sections 21 years ago; steroid shot in the back for pain about a year ago, restless leg syndrome (sleep dysfunction),   Work Hx: [employer] - ran a lot before; retired 4 years ago;  Living Situation: two story house - bedroom upstairs and difficulty going back upstairs  Pt Goals: \"I want to increase my leg strength and balance. \"    OBJECTIVE/EXAMINATION  Mobility: Independent  Self Care:  Independent    10 min [x]Eval                  []Re-Eval       24 min Therapeutic Exercise:  [x] See flow sheet :   Rationale: increase ROM and increase strength to improve the patients ability to perform ADLs and household chores          With   [x] TE   [] TA   [] neuro   [] other: Patient Education: [x] Review HEP    [] Progressed/Changed HEP based on:   [] positioning   [] body mechanics   [] transfers   [] heat/ice application    [] other:      Other Objective/Functional Measures:     Physical Therapy Evaluation - Knee    ROM / Strength  [] Unable to assess                  AROM                      PROM                   Strength (1-5)    Left Right Left Right Left Right   Hip Flexion     3+/5 4/5    Extension     5 5    Abduction     3 3    Adduction     3 4-   Knee Flexion WNL WNL   4-(pain) 4-(pain)    Extension WNL WNL   4-(pain) 4-(pain)   Ankle Plantarflexion          Dorsiflexion     5/5 5/5     Gluteus Max MMT: 3/5 bilaterally             Other tests/comments:   30 second sit to stands: 12x without UE support  -single leg balance left 11 seconds; right 12 seconds  Single leg heel raise: 12x right; left 10x  Squatting knee flexion: WNL    Pain Level (0-10 scale) post treatment: 5    ASSESSMENT/Changes in Function: See POC     [x]  See Plan of Care  []  See progress note/recertification  []  See Discharge Summary         Progress towards goals / Updated goals:  See POC    PLAN  []  Upgrade activities as tolerated     [x]  Continue plan of care  []  Update interventions per flow sheet       []  Discharge due to:_  []  Other:_      AMAN Mckeon 2/4/2022  11:17 AM

## 2022-02-08 ENCOUNTER — HOSPITAL ENCOUNTER (OUTPATIENT)
Dept: PHYSICAL THERAPY | Age: 47
Discharge: HOME | End: 2022-02-08
Payer: OTHER GOVERNMENT

## 2022-02-08 PROCEDURE — 97112 NEUROMUSCULAR REEDUCATION: CPT

## 2022-02-08 PROCEDURE — 97110 THERAPEUTIC EXERCISES: CPT

## 2022-02-08 NOTE — PROGRESS NOTES
PT DAILY TREATMENT NOTE     Patient Name: Shi Orellana  [id number]  : 1975  [x]  Patient  Verified  Payor: [facility] CCN / [facility] / Product Type: Federal Funded Programs /    In time:1:32  Out time:2:10  Total Treatment Time (min): 38  Visit #: 2 of 10    Medicare/BCBS Only   Total Timed Codes (min):   1:1 Treatment Time:         Treatment Area: Pain in right knee [M25.561]  Pain in left knee [M25.562]    SUBJECTIVE  Pain Level (0-10 scale): 6/10  Any medication changes, allergies to medications, adverse drug reactions, diagnosis change, or new procedure performed?: [x] No    [] Yes (see summary sheet for update)  Subjective functional status/changes:   [] No changes reported  \"My knees are stiff and painful today. \"    OBJECTIVE    28 min Therapeutic Exercise:  [] See flow sheet :   Rationale: increase ROM and increase strength to improve the patients ability to increase standing/amb tolerance, ease of transfers, stair negotiation     10 min Neuromuscular Re-education:  []  See flow sheet :   Rationale: increase strength, improve coordination, improve balance and increase proprioception  to improve the patients ability to improve LE stability for improved balance and ease of squatting    With   [x] TE   [] TA   [] neuro   [] other: Patient Education: [x] Review HEP    [] Progressed/Changed HEP based on:   [] positioning   [] body mechanics   [] transfers   [] heat/ice application    [] other:      Other Objective/Functional Measures: Initiated treatment program per flow sheet. Pt given verbal cueing for proper technique. Pain Level (0-10 scale) post treatment: 5/10    ASSESSMENT/Changes in Function: Pt instructed in first treatment session since evaluation and responded favorably to interventions performed. Pt given updated HEP for hip flexor and HS stretches as Pt notes knees get stiff and tight throughout the day.   Pt able to report slight improvement in pain symptoms post session. Patient will continue to benefit from skilled PT services to address functional mobility deficits, address ROM deficits, address strength deficits, analyze and address soft tissue restrictions, analyze and cue movement patterns, analyze and modify body mechanics/ergonomics, assess and modify postural abnormalities, address imbalance/dizziness and instruct in home and community integration to attain remaining goals. []  See Plan of Care  []  See progress note/recertification  []  See Discharge Summary         Progress towards goals / Updated goals:  Short Term Goals: To be accomplished in 1 week  - Goal: Pt to be compliant with initial HEP to improve ease of performing ADLs and household chores  Status at last note/certification: Established and reviewed with Pt  Current: met - Pt reports compliance with HEP (2/8/22)  Long Term Goals: To be accomplished in 10 treatments  - Goal: Pt to improve sit to stands to 16x to show increase in LE functional strength  Status at last note/certification: 37V without UE support  - Goal: Patient will ascend and descend 12 steps with reciprocal pattern to increase ease of entry into home  Status at last note/certification: 12 steps with step to pattern  - Goal: Patient will tolerate ambulation time of 30 min without symptom exacerbation to facilitate improved community ambulation. Status at last note/certification: 15 min  - Goal: Patient will increase FOTO score to at least 51 pts to demonstrate increased functional mobility. Status at last note/certification: FOTO 34 pts   -Goal: Patient will improve SLS to 30 seconds bilaterally to decrease risk for falls. Status at last note/certification: left 11 seconds; right 12 seconds  -Goal: Patient will be able to lift and carry 15-20# to carry groceries and perform household chores and tasks.   Status at last note/certification: not tested    PLAN  [x]  Upgrade activities as tolerated     [x] Continue plan of care  []  Update interventions per flow sheet       []  Discharge due to:_  []  Other:_      Ina Prader Daughtry, PT 2/8/2022  2:14 PM    Future Appointments   Date Time Provider Trisha Bundy   2/10/2022  2:15 PM Leticia Worthington [facility]   2/16/2022  9:00 AM Sigrid Landry [facility]   2/18/2022 10:30 AM Daughtry, Ina Prader, [facility]   2/21/2022  2:15 PM Leticia Worthington [facility]   2/23/2022  1:30 PM Leticia Worthington [facility]   2/28/2022  2:15 PM Sigrid Landry [facility]   6/20/2022 10:00 AM [facility] [address] [place] [address]   6/20/2022 10:15 AM Marcela Alvarez MD [address] [place] [address]

## 2022-02-10 ENCOUNTER — HOSPITAL ENCOUNTER (OUTPATIENT)
Dept: PHYSICAL THERAPY | Age: 47
Discharge: HOME | End: 2022-02-10
Payer: OTHER GOVERNMENT

## 2022-02-10 PROCEDURE — 97110 THERAPEUTIC EXERCISES: CPT

## 2022-02-10 PROCEDURE — 97530 THERAPEUTIC ACTIVITIES: CPT

## 2022-02-10 NOTE — PROGRESS NOTES
PT DAILY TREATMENT NOTE     Patient Name: Valarie Rodriguez  [id number]  : 1975  [x]  Patient  Verified  Payor: [facility] CCN / Plan: [facility] CCN / Product Type: Federal Funded Programs /    In [employer] time:310  Total Treatment Time (min): 54  Visit #: 3 of 10    Medicare/BCBS Only   Total Timed Codes (min):   1:1 Treatment Time:         Treatment Area: Pain in right knee [M25.561]  Pain in left knee [M25.562]    SUBJECTIVE  Pain Level (0-10 scale): 6/10  Any medication changes, allergies to medications, adverse drug reactions, diagnosis change, or new procedure performed?: [x] No    [] Yes (see summary sheet for update)  Subjective functional status/changes:   [] No changes reported  Pt reports no changes since last session. OBJECTIVE    Modality rationale: decrease pain, increase tissue extensibility and increase muscle contraction/control to improve the patients ability to tolerate functional mobility.     Min Type Additional Details    [] Estim:  []Unatt       []IFC  []Premod                        []Other:  []w/ice   []w/heat  Position:  Location:    [] Estim: []Att    []TENS instruct  []NMES                    []Other:  []w/US   []w/ice   []w/heat  Position:  Location:    []  Traction: [] Cervical       []Lumbar                       [] Prone          []Supine                       []Intermittent   []Continuous Lbs:  [] before manual  [] after manual    []  Ultrasound: []Continuous   [] Pulsed                           []1MHz   []3MHz W/cm2:  Location:    []  Iontophoresis with dexamethasone         Location: [] Take home patch   [] In clinic   10 []  Ice     [x]  heat  []  Ice massage  []  Laser   []  Anodyne Position:supine   Location:bilateral knees    []  Laser with stim  []  Other:  Position:  Location:    []  Vasopneumatic Device    []  Right     []  Left  Pre-treatment girth:  Post-treatment girth:  Measured at (location):  Pressure:       [] lo [] med [] hi Temperature: [] lo [] med [] hi   [] Skin assessment post-treatment:  []intact []redness- no adverse reaction    []redness  adverse reaction:     30 min Therapeutic Exercise:  [] See flow sheet :   Rationale: increase ROM, increase strength and improve coordination to improve the patients ability to tolerate functional mobility     15 min Therapeutic Activity:  []  See flow sheet :   Rationale: increase ROM, increase strength and improve coordination  to improve the patients ability to manage symptoms and tolerate ADLs. With   [] TE   [] TA   [] neuro   [] other: Patient Education: [x] Review HEP    [] Progressed/Changed HEP based on:   [] positioning   [] body mechanics   [] transfers   [] heat/ice application    [] other:      Other Objective/Functional Measures: updated HEP to include IT band/quad stretch. Pain Level (0-10 scale) post treatment: 3/10    ASSESSMENT/Changes in Function: Pt seen by PT to address bilateral knee pain, strength, and functional mobility. Pt with good tolerance of the session with minimal to no lasting increase in pain or discomfort. Pt challenged with hamstring and glute strengthening exercises. PT provided intermittent verbal/tactile cues for optimal form and alignment. Patient will continue to benefit from skilled PT services to modify and progress therapeutic interventions, address functional mobility deficits, address ROM deficits, address strength deficits, analyze and address soft tissue restrictions, analyze and cue movement patterns, analyze and modify body mechanics/ergonomics, assess and modify postural abnormalities, address imbalance/dizziness and instruct in home and community integration to attain remaining goals. []  See Plan of Care  []  See progress note/recertification  []  See Discharge Summary         Progress towards goals / Updated goals:  Short Term Goals:  To be accomplished in 1 week  - Goal: Pt to be compliant with initial HEP to improve ease of performing ADLs and household chores  Status at last note/certification: Established and reviewed with Pt  Current: met - Pt reports compliance with HEP (2/8/22)  Long Term Goals: To be accomplished in 10 treatments  - Goal: Pt to improve sit to stands to 16x to show increase in LE functional strength  Status at last note/certification: 12x without UE support  - Goal: Patient will ascend and descend 12 steps with reciprocal pattern to increase ease of entry into home  Status at last note/certification: 12 steps with step to pattern  - Goal: Patient will tolerate ambulation time of 30 min without symptom exacerbation to facilitate improved community ambulation. Status at last note/certification: 15 min  - Goal: Patient will increase FOTO score to at least 51 pts to demonstrate increased functional mobility. Status at last note/certification: [id number] 04 LFA   -Goal: Patient will improve SLS to 30 seconds bilaterally to decrease risk for falls. Status at last note/certification: left 11 seconds; right 12 seconds  -Goal: Patient will be able to lift and carry 15-20# to carry groceries and perform household chores and tasks.   Status at last note/certification: not tested    PLAN  [x]  Upgrade activities as tolerated     [x]  Continue plan of care  []  Update interventions per flow sheet       []  Discharge due to:_  []  Other:_      Mila Whitlock, PT 2/10/2022  2:18 PM    Future Appointments   Date Time Provider Trisha Bundy   2/16/2022  9:00 AM Peyman Moore [facility]   2/18/2022 10:30 AM Ira Rosales [facility]   2/21/2022  2:15 PM Catracho Hawkins [facility]   2/23/2022  1:30 PM Catracho Hawkins [facility]   2/28/2022  2:15 PM Peyman Moore [facility]   6/20/2022 10:00 AM [facility] MIMA Tapia   6/20/2022 10:15 AM MD Willie Johnston

## 2022-02-16 ENCOUNTER — HOSPITAL ENCOUNTER (OUTPATIENT)
Dept: PHYSICAL THERAPY | Age: 47
Discharge: HOME | End: 2022-02-16
Payer: OTHER GOVERNMENT

## 2022-02-16 PROCEDURE — 97110 THERAPEUTIC EXERCISES: CPT

## 2022-02-16 PROCEDURE — 97112 NEUROMUSCULAR REEDUCATION: CPT

## 2022-02-16 NOTE — PROGRESS NOTES
PT DAILY TREATMENT NOTE     Patient Name: Randell Homans  XCVM:  : 1975  [x]  Patient  Verified  Payor: [facility] Livingston / Plan: [facility] CCN / Product Type: Federal Funded Programs /    In time: 9:00 Out time: 9:45  Total Treatment Time (min): 45  Visit #: 4 of 10    Medicare/BCBS Only   Total Timed Codes (min):  1:1 Treatment Time:         Treatment Area: Pain in right knee [M25.561]  Pain in left knee [M25.562]    SUBJECTIVE  Pain Level (0-10 scale): 4  Any medication changes, allergies to medications, adverse drug reactions, diagnosis change, or new procedure performed?: [x] No    [] Yes (see summary sheet for update)  Subjective functional status/changes:   [] No changes reported  My knees are feeling a lot better with the stretches. Pt feels more soreness in hips after doing exercises last visit. She still has difficulty walking downstairs, especially in the mornings. She hopes to return to jogging, running and hiking. OBJECTIVE    15 min Therapeutic Exercise:  [] See flow sheet :   Rationale: increase ROM and increase strength to improve the patients ability to perform ADL's and household chores. 30 min Neuromuscular Re-education:  []  See flow sheet :   Rationale: increase strength, improve coordination, improve balance and increase proprioception  to improve the patients ability to safely perform stair negotiation and return to recreational activities. With   [] TE   [] TA   [] neuro   [] other: Patient Education: [x] Review HEP    [] Progressed/Changed HEP based on:   [] positioning   [] body mechanics   [] transfers   [] heat/ice application    [] other:      Other Objective/Functional Measures: Addition of eccentric step downs, mini squats, and 1# for prone hamstring curls.   Functional gains: Easier walking up stairs with continued use of rails  Functional deficits: Difficulty walking down the stairs    Pain Level (0-10 scale) post treatment: 1    ASSESSMENT/Changes in Function: Pt demonstrated significant improvements with stair ascent using reciprocal pattern however still uses caution and step to pattern with descent. LTG #5 met with bilateral SLS for 30 seconds achieved with no use of UE or compensation. She is progressing towards LTG's #2 with stair negotiation and LTG #3 with tolerated ambulation for 25 min. without exacerbated symptoms. Addition of eccentric step downs and mini squats performed well with minimal instability in left LE requiring minor correction response with UE. Will continue with skilled PT to increase LE strength and stability in order to return to recreational and community activities. Patient will continue to benefit from skilled PT services to modify and progress therapeutic interventions, address functional mobility deficits, address ROM deficits, address strength deficits, analyze and address soft tissue restrictions, analyze and cue movement patterns, analyze and modify body mechanics/ergonomics, assess and modify postural abnormalities, address imbalance/dizziness and instruct in home and community integration to attain remaining goals. []  See Plan of Care  []  See progress note/recertification  []  See Discharge Summary         Progress towards goals / Updated goals:  Short Term Goals: To be accomplished in 1 week  - Goal: Pt to be compliant with initial HEP to improve ease of performing ADLs and household chores  Status at last note/certification: Established and reviewed with Pt  Current: Met - Pt reports compliance with HEP (2/8/22)    Long Term Goals:  To be accomplished in 10 treatments  - Goal: Pt to improve sit to stands to 16x to show increase in LE functional strength  Status at last note/certification: 12x without UE support    - Goal: Patient will ascend and descend 12 steps with reciprocal pattern to increase ease of entry into home  Status at last note/certification: 12 steps with step to pattern  Current: Progressing- 12 steps ascending with reciprocal pattern, descending with step to pattern (2022)    - Goal: Patient will tolerate ambulation time of 30 min without symptom exacerbation to facilitate improved community ambulation. Status at last note/certification: 15 min  Current: Progressin min. (2022)    - Goal: Patient will increase FOTO score to at least 51 pts to demonstrate increased functional mobility. Status at last note/certification: ZECI 34 SCU     -Goal: Patient will improve SLS to 30 seconds bilaterally to decrease risk for falls. Status at last note/certification: left 11 seconds; right 12 seconds   Current: Met- R: 30 seconds; left: 30 seconds (2022)    -Goal: Patient will be able to lift and carry 15-20# to carry groceries and perform household chores and tasks.   Status at last note/certification: not tested    PLAN  [x]  Upgrade activities as tolerated     []  Continue plan of care  []  Update interventions per flow sheet       []  Discharge due to:_  []  Other:_      Jeannie Morrison, MIKHAIL 2022  8:56 AM    Future Appointments   Date Time Provider Trisha Bundy   2022  9:00 AM [facility]   2022 10:30 AM Gabriella Rosales, [facility]   2022  2:15 PM Aaron Vickers [facility]   2022  1:30 PM Aaron Vickers [facility]   2022  2:15 PM [facility]   2022 10:00 AM [facility] [address] [place] [address]   2022 10:15 AM Kika An MD [address] [place] [address]

## 2022-02-18 ENCOUNTER — HOSPITAL ENCOUNTER (OUTPATIENT)
Dept: PHYSICAL THERAPY | Age: 47
Discharge: HOME | End: 2022-02-18
Payer: OTHER GOVERNMENT

## 2022-02-18 PROCEDURE — 97112 NEUROMUSCULAR REEDUCATION: CPT

## 2022-02-18 PROCEDURE — 97110 THERAPEUTIC EXERCISES: CPT

## 2022-02-18 NOTE — PROGRESS NOTES
PT DAILY TREATMENT NOTE     Patient Name: Mike Echeverria  BYDH:  : 1975  [x]  Patient  Verified  Payor: [facility] CCN / Plan: [facility] CCN / Product Type: Federal Funded Programs /    In time: 10:30 Out time: 11:18  Total Treatment Time (min): 48  Visit #: 5 of 10    Medicare/BCBS Only   Total Timed Codes (min):  1:1 Treatment Time:         Treatment Area: Pain in right knee [M25.561]  Pain in left knee [M25.562]    SUBJECTIVE  Pain Level (0-10 scale): 6/10  Any medication changes, allergies to medications, adverse drug reactions, diagnosis change, or new procedure performed?: [x] No    [] Yes (see summary sheet for update)  Subjective functional status/changes:   [] No changes reported  \"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 It was heavy! And now my knees are sore from the stooping, lifting repetitively. \"    OBJECTIVE    Modality rationale: decrease inflammation and decrease pain to improve the patients ability to reduce post therapy soreness   Min Type Additional Details    [] Estim:  []Unatt       []IFC  []Premod                        []Other:  []w/ice   []w/heat  Position:  Location:    [] Estim: []Att    []TENS instruct  []NMES                    []Other:  []w/US   []w/ice   []w/heat  Position:  Location:    []  Traction: [] Cervical       []Lumbar                       [] Prone          []Supine                       []Intermittent   []Continuous Lbs:  [] before manual  [] after manual    []  Ultrasound: []Continuous   [] Pulsed                           []1MHz   []3MHz Location:  W/cm2:    []  Iontophoresis with dexamethasone         Location: [] Take home patch   [] In clinic   10 [x]  Ice     []  heat  []  Ice massage  []  Laser   []  Anodyne Position:   Location:    []  Laser with stim  []  Other: Position:  Location:    []  Vasopneumatic Device  Pre-treatment girth:  Post-treatment girth:  Measured at (location):  Pressure:       [] lo [] med [] hi   Temperature: [] lo [] med [] hi   [] Skin assessment post-treatment:  []intact []redness- no adverse reaction    []redness  adverse reaction:     15 min Therapeutic Exercise:  [] See flow sheet :   Rationale: increase ROM and increase strength to improve the patients ability to perform ADL's and household chores. 23 min Neuromuscular Re-education:  []  See flow sheet :   Rationale: increase strength, improve coordination, improve balance and increase proprioception  to improve the patients ability to safely perform stair negotiation and return to recreational activities. With   [] TE   [] TA   [] neuro   [] other: Patient Education: [x] Review HEP    [] Progressed/Changed HEP based on:   [] positioning   [] body mechanics   [] transfers   [] heat/ice application    [] other:      Other Objective/Functional Measures: Continued with exercises per flow sheeting, increasing repetitions for seated leg pumps and prone HS curls. Pain Level (0-10 scale) post treatment: 0/10    ASSESSMENT/Changes in Function:  Pt able to complete full program per flow sheet, but did note fatigue in LEs as session progressed, most likely from increased activity from the day prior. Applied cold pack to address post therapy soreness to B knees and Pt able to report resolution of pain post session. Pt would benefit from continued skilled therapy to improve concentric/eccentric quad control and strength and pain management to be able to negotiate stairs, squat, and lift objects with less difficulty.     Patient will continue to benefit from skilled PT services to modify and progress therapeutic interventions, address functional mobility deficits, address ROM deficits, address strength deficits, analyze and address soft tissue restrictions, analyze and cue movement patterns, analyze and modify body mechanics/ergonomics, assess and modify postural abnormalities, address imbalance/dizziness and instruct in home and community integration to attain remaining goals. []  See Plan of Care  []  See progress note/recertification  []  See Discharge Summary         Progress towards goals / Updated goals:  Short Term Goals: To be accomplished in 1 week  - Goal: Pt to be compliant with initial HEP to improve ease of performing ADLs and household chores  Status at last note/certification: Established and reviewed with Pt  Current: Met - Pt reports compliance with HEP (22)    Long Term Goals: To be accomplished in 10 treatments  - Goal: Pt to improve sit to stands to 16x to show increase in LE functional strength  Status at last note/certification: 12x without UE support  Current: reassess near MD note (22)    - Goal: Patient will ascend and descend 12 steps with reciprocal pattern to increase ease of entry into home  Status at last note/certification: 12 steps with step to pattern  Current: Progressing- 12 steps ascending with reciprocal pattern, descending with step to pattern (2022)    - Goal: Patient will tolerate ambulation time of 30 min without symptom exacerbation to facilitate improved community ambulation. Status at last note/certification: 15 min  Current: Progressin min. (2022)    - Goal: Patient will increase FOTO score to at least 51 pts to demonstrate increased functional mobility. Status at last note/certification: HWNQ 75 QWV  Current: reassess at MD note (22)     -Goal: Patient will improve SLS to 30 seconds bilaterally to decrease risk for falls. Status at last note/certification: left 11 seconds; right 12 seconds   Current: Met- R: 30 seconds; left: 30 seconds (2022)    -Goal: Patient will be able to lift and carry 15-20# to carry groceries and perform household chores and tasks.   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 (2/18/22)    PLAN  [x]  Upgrade activities as tolerated     []  Continue plan of care  []  Update interventions per flow sheet       []  Discharge due to:_  []  Other:_      Indra Rosales, PT 2/18/2022  8:56 AM    Future Appointments   Date Time Provider Trisha Bundy   2/21/2022  2:15 PM Antonio Bergman, [facility]   2/23/2022  1:30 PM Antonio Bergman [facility]   2/28/2022  2:15 PM Sruthi Wilson [facility]   6/20/2022 10:00 AM EMILY Cyr   6/20/2022 10:15 AM Caryle Lord, MD Clement Foots

## 2022-02-21 ENCOUNTER — HOSPITAL ENCOUNTER (OUTPATIENT)
Dept: PHYSICAL THERAPY | Age: 47
Discharge: HOME | End: 2022-02-21
Payer: OTHER GOVERNMENT

## 2022-02-21 PROCEDURE — 97113 AQUATIC THERAPY/EXERCISES: CPT

## 2022-02-21 PROCEDURE — 97110 THERAPEUTIC EXERCISES: CPT

## 2022-02-21 NOTE — PROGRESS NOTES
PT DAILY TREATMENT NOTE     Patient Name: Keena Rubio  OAIF:  : 1975  [x]  Patient  Verified  Payor: [facility] CCN / Plan: [facility] CCN / Product Type: Federal Funded Programs /    In [employer] time:2:45  Total Treatment Time (min): 40  Visit #: 6 of 10    Medicare/BCBS Only   Total Timed Codes (min):   1:1 Treatment Time:         Treatment Area: Pain in right knee [M25.561]  Pain in left knee [M25.562]    SUBJECTIVE  Pain Level (0-10 scale): 6/10  Any medication changes, allergies to medications, adverse drug reactions, diagnosis change, or new procedure performed?: [x] No    [] Yes (see summary sheet for update)  Subjective functional status/changes:   [] No changes reported  Pt reports mild increase in pain and reports that the ice has helped    OBJECTIVE    40 min Therapeutic Exercise:  [] See flow sheet :   Rationale: increase ROM, increase strength and improve coordination to improve the patients ability to tolerate functional mobility          With   [] TE   [] TA   [] neuro   [] other: Patient Education: [x] Review HEP    [] Progressed/Changed HEP based on:   [] positioning   [] body mechanics   [] transfers   [] heat/ice application    [] other:       Pain Level (0-10 scale) post treatment: 3/10    ASSESSMENT/Changes in Function: Pt seen by PT to address bilateral knee pain, strength, and functional mobility. Pt with good tolerance of the session with decrease in pain following session. Pt challenged with eccentric lowering exercises secondary to quadriceps weakness. PT provided intermittent verbal/tactile cues for optimal form and alignment.      Patient will continue to benefit from skilled PT services to modify and progress therapeutic interventions, address functional mobility deficits, address ROM deficits, address strength deficits, analyze and address soft tissue restrictions, analyze and cue movement patterns, analyze and modify body mechanics/ergonomics, assess and modify postural abnormalities, address imbalance/dizziness and instruct in home and community integration to attain remaining goals. []  See Plan of Care  []  See progress note/recertification  []  See Discharge Summary         Progress towards goals / Updated goals:  Short Term Goals: To be accomplished in 1 week  - Goal: Pt to be compliant with initial HEP to improve ease of performing ADLs and household chores  Status at last note/certification: Established and reviewed with Pt  Current: Met - Pt reports compliance with HEP (22)     Long Term Goals: To be accomplished in 10 treatments  - Goal: Pt to improve sit to stands to 16x to show increase in LE functional strength  Status at last note/certification: 12x without UE support  Current: reassess near MD note (22)     - Goal: Patient will ascend and descend 12 steps with reciprocal pattern to increase ease of entry into home  Status at last note/certification: 12 steps with step to pattern  Current: Progressing- 12 steps ascending with reciprocal pattern, descending with step to pattern (2022)     - Goal: Patient will tolerate ambulation time of 30 min without symptom exacerbation to facilitate improved community ambulation. Status at last note/certification: 15 min  Current: Progressin min. (2022)     - Goal: Patient will increase FOTO score to at least 51 pts to demonstrate increased functional mobility. Status at last note/certification: QPRW 60 PEQ  Current: reassess at MD note (22)      -Goal: Patient will improve SLS to 30 seconds bilaterally to decrease risk for falls. Status at last note/certification: left 11 seconds; right 12 seconds   Current: Met- R: 30 seconds; left: 30 seconds (2022)     -Goal: Patient will be able to lift and carry 15-20# to carry groceries and perform household chores and tasks.   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 (2/18/22)    PLAN  [x]  Upgrade activities as tolerated     [x]  Continue plan of care  []  Update interventions per flow sheet       []  Discharge due to:_  []  Other:_      Benja Guadalupe, PT 2/21/2022  2:04 PM    Future Appointments   Date Time Provider Trisha Bundy   2/21/2022  2:15 PM Ela Peterson, PT [facility]   2/23/2022  1:30 PM Ela Peterson, PT [facility]   2/28/2022  2:15 PM Sean Mejia, PT [facility]   6/20/2022 10:00 AM EMILY GUILLERMO [id number]   6/20/2022 10:15 AM Joshua Kimbrough MD [address] [place] [address]

## 2022-02-23 ENCOUNTER — HOSPITAL ENCOUNTER (OUTPATIENT)
Dept: PHYSICAL THERAPY | Age: 47
Discharge: HOME | End: 2022-02-23
Payer: OTHER GOVERNMENT

## 2022-02-23 PROCEDURE — 97110 THERAPEUTIC EXERCISES: CPT

## 2022-02-23 NOTE — PROGRESS NOTES
PT DAILY TREATMENT NOTE     Patient Name: Misty Hinton  Date:2022  : 1975  [x]  Patient  Verified  Payor: [facility] CCN / Plan: [facility] CCN / Product Type: Federal Funded Programs /    In time:130  Out time:215  Total Treatment Time (min): 45  Visit #: 7 of 10    Medicare/BCBS Only   Total Timed Codes (min):   1:1 Treatment Time:         Treatment Area: Pain in right knee [M25.561]  Pain in left knee [M25.562]    SUBJECTIVE  Pain Level (0-10 scale): 0/10  Any medication changes, allergies to medications, adverse drug reactions, diagnosis change, or new procedure performed?: [x] No    [] Yes (see summary sheet for update)  Subjective functional status/changes:   [] No changes reported  Pt reports she may have sprained her ankle since last session. Pt reports her left ankle isn't too bad but she will monitor. OBJECTIVE    45 min Therapeutic Exercise:  [] See flow sheet :   Rationale: increase ROM, increase strength and improve coordination to improve the patients ability to tolerate functional mobility          With   [] TE   [] TA   [] neuro   [] other: Patient Education: [x] Review HEP    [] Progressed/Changed HEP based on:   [] positioning   [] body mechanics   [] transfers   [] heat/ice application    [] other:      Other Objective/Functional Measures: HEP updated to strengthen/stretch bilateral calf. Pain Level (0-10 scale) post treatment: 0/10    ASSESSMENT/Changes in Function: Pt seen by PT to address bilateral knee pain, ROM, muscle flexibility, and strength . Pt with good tolerance of the session with no lasting increase in pain. Pt challenged with hip and glute strengthening exercises secondary to weakness. Some exercises modified secondary to new onset of ankle pain. PT provided intermittent verbal/tactile cues for optimal form and alignment.      Patient will continue to benefit from skilled PT services to modify and progress therapeutic interventions, address functional mobility deficits, address ROM deficits, address strength deficits, analyze and address soft tissue restrictions, analyze and cue movement patterns, analyze and modify body mechanics/ergonomics, assess and modify postural abnormalities, address imbalance/dizziness and instruct in home and community integration to attain remaining goals. []  See Plan of Care  []  See progress note/recertification  []  See Discharge Summary         Progress towards goals / Updated goals:  Short Term Goals: To be accomplished in 1 week  - Goal: Pt to be compliant with initial HEP to improve ease of performing ADLs and household chores  Status at last note/certification: Established and reviewed with Pt  Current: Met - Pt reports compliance with HEP (22)     Long Term Goals: To be accomplished in 10 treatments  - Goal: Pt to improve sit to stands to 16x to show increase in LE functional strength  Status at last note/certification: 12x without UE support  Current: reassess near MD note (22)     - Goal: Patient will ascend and descend 12 steps with reciprocal pattern to increase ease of entry into home  Status at last note/certification: 12 steps with step to pattern  Current: Progressing- 12 steps ascending with reciprocal pattern, descending with step to pattern (2022)     - Goal: Patient will tolerate ambulation time of 30 min without symptom exacerbation to facilitate improved community ambulation. Status at last note/certification: 15 min  Current: Progressin min. (2022)     - Goal: Patient will increase FOTO score to at least 51 pts to demonstrate increased functional mobility. Status at last note/certification: YUMJ 49 KHZ  Current: reassess at MD note (22)      -Goal: Patient will improve SLS to 30 seconds bilaterally to decrease risk for falls.   Status at last note/certification: left 11 seconds; right 12 seconds   Current: Met- R: 30 seconds; left: 30 seconds (2/16/2022)     -Goal: Patient will be able to lift and carry 15-20# to carry groceries and perform household chores and tasks.   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

## 2022-02-28 ENCOUNTER — TELEPHONE (OUTPATIENT)
Dept: PHYSICAL THERAPY | Age: 47
End: 2022-02-28

## 2022-03-03 ENCOUNTER — HOSPITAL ENCOUNTER (OUTPATIENT)
Dept: PHYSICAL THERAPY | Age: 47
Discharge: HOME | End: 2022-03-03
Payer: OTHER GOVERNMENT

## 2022-03-03 PROCEDURE — 97110 THERAPEUTIC EXERCISES: CPT

## 2022-03-03 PROCEDURE — 97530 THERAPEUTIC ACTIVITIES: CPT

## 2022-03-03 NOTE — PROGRESS NOTES
PT DISCHARGE DAILY NOTE AND [id number]    Patient name: Leonidas Zhou Start of Care: 2022   Referral source: Garrett Lucia MD : 1975   Medical/Treatment Diagnosis: Pain in right knee [M25.561]  Pain in left knee [M25.562] Onset Date:2021     Prior Hospitalization: see medical history Provider#: [id number]   Medications: Verified on Patient Summary List    Comorbidities: Two c-sections 21 years ago; steroid shot in the back for pain about a year ago, restless leg syndrome (sleep dysfunction), incontinence, GI disease, anxiety or panic disorder, back pain, depression, headaches, kidney bladder, prostate or urination problems,   Prior Level of Function[de-identified] Independent with ADLs    Visits from Start of Care: 2022    Missed Visits: 1    Reporting Period : 2022 to 3/3/2022    Date:3/3/2022  : 1975  [x]  Patient  Verified  Payor: [employer] [facility] CCN / Plan: [facility] [employer] [facility] CCN / Product Type: Federal Funded Programs /    In time:2:15  Out time:2:45  Total Treatment Time (min): 30  Visit #: 8 of 10    Medicare/BCBS Only   Total Timed Codes (min):   1:1 Treatment Time:         SUBJECTIVE  Pain Level (0-10 scale): 4  Any medication changes, allergies to medications, adverse drug reactions, diagnosis change, or new procedure performed?: [x] No    [] Yes (see summary sheet for update)  Subjective functional status/changes:   [] No changes reported  I fele so much better.   Any pain that I do get I manage with ice    OBJECTIVE    15 min Therapeutic Exercise:  [] See flow sheet :   Rationale: increase ROM and increase strength to improve the patients ability to perform functional tasks    15 min Therapeutic Activity:  []  See flow sheet :   Rationale: increase strength and improve coordination  to improve the patients ability to improve ease of stair descent        With   [] TE   [] TA   [] neuro   [] other: Patient Education: [x] Review HEP    [] Progressed/Changed HEP based on:   [] positioning   [] body mechanics   [] transfers   [] heat/ice application    [] other:      Other Objective/Functional Measures: PD cpmpleting ex's today     Pain Level (0-10 scale) post treatment: 2    Summary of Care:  - Goal: Pt to be compliant with initial HEP to improve ease of performing ADLs and household chores  Status at last note/certification: Established and reviewed with Pt  Current: Met - Pt reports compliance with HEP (22)     Long Term Goals: To be accomplished in 10 treatments  - Goal: Pt to improve sit to stands to 16x to show increase in LE functional strength  Status at last note/certification: 27X without UE support  Current: progressing 15 x  (3/3/2022)     - Goal: Patient will ascend and descend 12 steps with reciprocal pattern to increase ease of entry into home  Status at last note/certification: 12 steps with step to pattern  Current: Progressing- 12 steps reciprocally on ascent and descent (3/3/2022)     - Goal: Patient will tolerate ambulation time of 30 min without symptom exacerbation to facilitate improved community ambulation. Status at last note/certification: 15 min  Current: Progressin min. ([id number])     - Goal: Patient will increase FOTO score to at least 51 pts to demonstrate increased functional mobility. Status at last note/certification: FOTO 34 pts  Current: Met  FOTO 61     -Goal: Patient will improve SLS to 30 seconds bilaterally to decrease risk for falls. Status at last note/certification: left 11 seconds; right 12 seconds   Current: Met- R: 30 seconds; left: 30 seconds (3/3/2022)     -Goal: Patient will be able to lift and carry 15-20# to carry groceries and perform household chores and tasks.   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 [M25.562]. Since [facility], she demonstrates gains in stability and strength that have enabled her to increase walking tolerance to 45 minutes, and to negotiate stairs reciprocally on ascent and descent. She does reports some \"shaking\" in knees with eccentric lowering on stairs, but is much improved. Symptoms are manaed with use of ice as needed. Will discharge at this time to [facility].     Thank you for this referral!      PLAN  [x]Discontinue therapy: [x]Patient has reached or is progressing toward set goals      []Patient is non-compliant or has abdicated      []Due to lack of appreciable progress towards set goals    Delaney Torrez, PT 3/3/2022  2:51 PM

## 2022-03-03 NOTE — PROGRESS NOTES
Physical Therapy Discharge Instructions      [facility] [place] IRISHAdrián DUMONT [facility], [address] [place] [address]  [phone] fax    Patient: Brielle Crain  : 1975      Continue Home Exercise Program 1-2 times per day for 4 weeks, then decrease to 3 times per week      Continue with    [x] Ice  as needed  1-2 times per day     [] Heat           Follow up with MD:     [] Upon completion of therapy     [x] As needed      Recommendations:     [x]   Return to activity with home program    []   Return to activity with the following modifications:       []Post Rehab Program    []Join Independent aquatic program     []Return to/join local gym        Additional Comments:         Ara Mota, PTA 3/3/2022 2:58 PM

## 2022-03-07 ENCOUNTER — APPOINTMENT (OUTPATIENT)
Dept: PHYSICAL THERAPY | Age: 47
End: 2022-03-07
Payer: OTHER GOVERNMENT

## 2022-03-08 ENCOUNTER — TELEPHONE (OUTPATIENT)
Dept: ORTHOPEDIC SURGERY | Age: 47
End: 2022-03-08

## 2022-03-08 NOTE — TELEPHONE ENCOUNTER
We have received a request to schedule an EMG. I reached identified voice and left a detailed message providing my direct number requesting a return call.     EMG BUE numbness ref by dr Hugh Dorman

## 2022-03-10 ENCOUNTER — APPOINTMENT (OUTPATIENT)
Dept: PHYSICAL THERAPY | Age: 47
End: 2022-03-10
Payer: OTHER GOVERNMENT

## 2022-03-14 ENCOUNTER — APPOINTMENT (OUTPATIENT)
Dept: PHYSICAL THERAPY | Age: 47
End: 2022-03-14
Payer: OTHER GOVERNMENT

## 2022-03-14 NOTE — TELEPHONE ENCOUNTER
Ms. Corry Zazueta returned my call and is scheduled on 04/27/22 Name: Shahid Luna      : 1967  Date: 19     Time: 2:40 AM  Location of patient: [facility] emergency room, to be admitted to the 3B unit      Location of doctor: [facility]                                                   Phone Note       I received a phone consult to place admission orders on this patient  She is a 49-year-old woman who has history of daily alcohol consumption, previous overdoses, who came with overdose on Toprol  Her blood pressure in the emergency room is high, 161/96  I reviewed the chart and placed the admission orders  I discussed the case with the emergency room attending who will give her doses of Norvasc and clonidine before she goes to the psychiatric unit      Oscar De La Cruz MD

## 2022-03-17 ENCOUNTER — APPOINTMENT (OUTPATIENT)
Dept: PHYSICAL THERAPY | Age: 47
End: 2022-03-17
Payer: OTHER GOVERNMENT

## 2022-03-21 ENCOUNTER — APPOINTMENT (OUTPATIENT)
Dept: PHYSICAL THERAPY | Age: 47
End: 2022-03-21
Payer: OTHER GOVERNMENT

## 2022-03-24 ENCOUNTER — APPOINTMENT (OUTPATIENT)
Dept: PHYSICAL THERAPY | Age: 47
End: 2022-03-24
Payer: OTHER GOVERNMENT

## 2022-06-03 ENCOUNTER — OFFICE VISIT (OUTPATIENT)
Dept: ORTHOPEDIC SURGERY | Age: 47
End: 2022-06-03
Payer: OTHER GOVERNMENT

## 2022-06-03 VITALS
DIASTOLIC BLOOD PRESSURE: 82 MMHG | OXYGEN SATURATION: 98 % | HEIGHT: 62 IN | BODY MASS INDEX: 30 KG/M2 | TEMPERATURE: 97 F | HEART RATE: 78 BPM | WEIGHT: 163 LBS | SYSTOLIC BLOOD PRESSURE: 129 MMHG

## 2022-06-03 DIAGNOSIS — R94.131 ABNORMAL EMG: ICD-10-CM

## 2022-06-03 DIAGNOSIS — R20.2 NUMBNESS AND TINGLING IN BOTH HANDS: Primary | ICD-10-CM

## 2022-06-03 DIAGNOSIS — R20.0 NUMBNESS AND TINGLING IN BOTH HANDS: Primary | ICD-10-CM

## 2022-06-03 PROCEDURE — 95886 MUSC TEST DONE W/N TEST COMP: CPT | Performed by: PHYSICAL MEDICINE & REHABILITATION

## 2022-06-03 PROCEDURE — 95912 NRV CNDJ TEST 11-12 STUDIES: CPT | Performed by: PHYSICAL MEDICINE & REHABILITATION

## 2022-06-03 NOTE — PROGRESS NOTES
[address]  [address]  [facility], [address]  Phone: [phone]  Fax: [phone]        Mikal Chaim  : 1975  PCP: Madison Pimentel MD  6/3/2022    ELECTROMYOGRAPHY AND NERVE CONDUCTION STUDIES    Cory Mccord was referred by Dr. Edwin Hernández for electrodiagnostic evaluation of bilateral hand numbness and tingling. NCV & EMG Findings:  Evaluation of the left median/ulnar (dig IV) comparison and the right median/ulnar (dig IV) comparison nerves showed prolonged distal peak latency (Median Wr, L3.5, R3.6 ms) and abnormal peak latency difference (Median Wr-Ulnar Wr, L0.7, R1.1 ms). All remaining nerves (as indicated in the following tables) were within normal limits. Left vs. Right side comparison data for the median motor nerve indicates abnormal L-R latency difference (0.8 ms). The ulnar motor nerve indicates abnormal L-R velocity difference (B Elbow-Wrist, 11 m/s). All remaining left vs. right side differences were within normal limits. All examined muscles (as indicated in the following table) showed no evidence of electrical instability. INTERPRETATION  This is an abnormal electrodiagnostic examination. These findings may be consistent with:   1. Mild median mononeuropathy at the right wrist (carpal tunnel syndrome)   2. Very mild median mononeuropathy at the left wrist (carpal tunnel syndrome)    There is no electrodiagnostic evidence of any cervical radiculopathy, brachial plexopathy, peripheral polyneuropathy, or any other mononeuropathy. CLINICAL INTERPRETATION  Her electrodiagnostic findings of median mononeuropathy at the wrist are consistent with her bilateral hand symptoms. Compared to her last study, she has significantly improved. HISTORY OF PRESENT ILLNESS  Cory Mccord is a 52 y.o. female. Pt presents today for BUE EMG evaluation of bilateral hand numbness and tingling.  A RUE EMG dated 10/14/2020 was suggestive of moderate carpal tunnel syndrome. PAST MEDICAL HISTORY   Past Medical History:   Diagnosis Date    History of uterine fibroid        Past Surgical History:   Procedure Laterality Date    HX  SECTION      x2   . MEDICATIONS    Current Outpatient Medications   Medication Sig Dispense Refill    trospium (SANCTURA) 20 mg tablet Take 1 Tab by mouth two (2) times a day. 180 Tab 3    triamcinolone acetonide (KENALOG) 0.5 % ointment 1 application to affected area      metroNIDAZOLE (FLAGYL) 250 mg tablet Take  by mouth three (3) times daily.  rOPINIRole (REQUIP) 0.5 mg tablet Take  by mouth three (3) times daily.  amitriptyline (ELAVIL) 10 mg tablet Take  by mouth nightly.  diclofenac (VOLTAREN) 1 % gel Apply  to affected area four (4) times daily.  magnesium oxide (MAG-OX) 400 mg tablet Take 400 mg by mouth daily.  omeprazole (PRILOSEC) 20 mg capsule Take 20 mg by mouth daily.  cyclobenzaprine (FLEXERIL) 10 mg tablet Take 10 mg by mouth two (2) times a day. ALLERGIES  Allergies   Allergen Reactions    Latex Rash          SOCIAL HISTORY    Social History     Socioeconomic History    Marital status:    Tobacco Use    Smoking status: Never Smoker    Smokeless tobacco: Never Used   Substance and Sexual Activity    Alcohol use: Yes     Comment: occassionally       FAMILY HISTORY  No family history on file.       PHYSICAL EXAMINATION  Visit Vitals  BP (!) 147/90 (BP 1 Location: Right upper arm, BP Patient Position: Sitting, BP Cuff Size: Large adult) Comment: pt asymptomatic, MD aware   Pulse 78   Temp 97 °F (36.1 °C) (Temporal)   Ht 5' 2\" (1.575 m)   Wt 163 lb (73.9 kg)   SpO2 98% Comment: RA   BMI 29.81 kg/m²       Pain Assessment  6/3/2022   Location of Pain -   Location Modifiers -   Severity of Pain 0   Quality of Pain Other (Comment)   Quality of Pain Comment n/t to bilareral arms from elbow down   Duration of Pain -   Frequency of Pain - Aggravating Factors -   Aggravating Factors Comment -   Limiting Behavior -   Relieving Factors -   Relieving Factors Comment -   Result of Injury -   Work-Related Injury -   Type of Injury -           Constitutional:  Well developed, well nourished, in no acute distress. Psychiatric: Affect and mood are appropriate. Integumentary: No rashes or abrasions noted on exposed areas. SPINE/MUSCULOSKELETAL EXAM    On brief examination: None.       NCV & EMG Findings:  Nerve Conduction Studies  Anti Sensory Summary Table     Stim Site NR Peak (ms) Norm Peak (ms) O-P Amp (µV) Norm O-P Amp Site1 Site2 Delta-P (ms) Dist (cm) Deo (m/s) Norm Deo (m/s)   Left Median Anti Sensory (2nd Digit)   Wrist    3.3 <4 33.8 >13 Wrist 2nd Digit 3.3 14.0 42 >39   Right Median Anti Sensory (2nd Digit)   Wrist    3.5 <4 20.8 >13 Wrist 2nd Digit 3.5 14.0 40 >39   Palm    1.5 <2.3 29.4 >8 Palm 2nd Digit 1.5 7.0 47    Left Radial Anti Sensory (Base 1st Digit)   Wrist    1.9 2.8 63.0 11 Wrist Base 1st Digit 1.9 10.0 53    Left Ulnar Anti Sensory (5th Digit)   Wrist    2.8 <4.0 32.9 >9 Wrist 5th Digit 2.8 14.0 50 >38   Right Ulnar Anti Sensory (5th Digit)   Wrist    2.7 <4.0 40.5 >9 Wrist 5th Digit 2.7 14.0 52 >38     Motor Summary Table     Stim Site NR Onset (ms) Norm Onset (ms) O-P Amp (mV) Norm O-P Amp Site1 Site2 Delta-0 (ms) Dist (cm) Deo (m/s) Norm Deo (m/s)   Left Median Motor (Abd Poll Brev)   Wrist    3.3 <4.5 6.5 >4.1 Elbow Wrist 3.6 22.0 61 >49   Elbow    6.9  5.6          Right Median Motor (Abd Poll Brev)   Wrist    4.1 <4.5 11.7 >4.1 Elbow Wrist 3.7 21.5 58 >49   Elbow    7.8  10.2          Left Ulnar Motor (Abd Dig Min)   Wrist    2.7 <3.7 13.6 >7.9 B Elbow Wrist 2.3 16.5 72 >52   B Elbow    5.0  13.4  A Elbow B Elbow 1.6 10.0 63 >43   A Elbow    6.6  12.9          Right Ulnar Motor (Abd Dig Min)   Wrist    2.5 <3.7 13.1 >7.9 B Elbow Wrist 2.7 16.5 61 >52   B Elbow    5.2  12.6  A Elbow B Elbow 1.5 10.0 67 >43   A Elbow 6. 7  12.2            Comparison Summary Table     Stim Site NR Peak (ms) Norm Peak (ms) O-P Amp (µV) Site1 Site2 Delta-P (ms) Norm Delta (ms)   Left Median/Ulnar Dig IV Comparison (Digit 4 - 14cm)   Median Wr    3.5 <3.3 14.9 Median Wr Ulnar Wr 0.7 <0.4   Ulnar Wr    2.8 <3.3 22.0       Right Median/Ulnar Dig IV Comparison (Digit 4 - 14cm)   Median Wr    3.6 <3.3 5.6 Median Wr Ulnar Wr 1.1 <0.4   Ulnar Wr    2.5 <3.3 18.8         EMG     Side Muscle Nerve Root Ins Act Fibs Psw Amp Dur Poly Recrt Int Brandon Bun Comment   Right Biceps Musculocut C5-6 Nml Nml Nml Nml Nml 0 Nml Nml    Right Triceps Radial C6-7-8 Nml Nml Nml Nml Nml 0 Nml Nml    Right PronatorTeres Median C6-7 Nml Nml Nml Nml Nml 0 Nml Nml    Right Abd Poll Brev Median C8-T1 Nml Nml Nml Nml Nml 0 Nml Nml    Right 1stDorInt Ulnar C8-T1 Nml Nml Nml Nml Nml 0 Nml Nml    Left Biceps Musculocut C5-6 Nml Nml Nml Nml Nml 0 Nml Nml    Left Triceps Radial C6-7-8 Nml Nml Nml Nml Nml 0 Nml Nml    Left PronatorTeres Median C6-7 Nml Nml Nml Nml Nml 0 Nml Nml    Left Abd Poll Brev Median C8-T1 Nml Nml Nml Nml Nml 0 Nml Nml    Left 1stDorInt Ulnar C8-T1 Nml Nml Nml Nml Nml 0 Nml Nml        Nerve Conduction Studies  Anti Sensory Left/Right Comparison     Stim Site L Lat (ms) R Lat (ms) L-R Lat (ms) L Amp (µV) R Amp (µV) L-R Amp (%) Site1 Site2 L Deo (m/s) R Deo (m/s) L-R Deo (m/s)   Median Anti Sensory (2nd Digit)   Wrist 3.3 3.5 0.2 33.8 20.8 38.5 Wrist 2nd Digit 42 40 2   Radial Anti Sensory (Base 1st Digit)   Wrist 1.9   63.0   Wrist Base 1st Digit 53     Ulnar Anti Sensory (5th Digit)   Wrist 2.8 2.7 0.1 32.9 40.5 18.8 Wrist 5th Digit 50 52 2     Motor Left/Right Comparison     Stim Site L Lat (ms) R Lat (ms) L-R Lat (ms) L Amp (mV) R Amp (mV) L-R Amp (%) Site1 Site2 L Deo (m/s) R Deo (m/s) L-R Deo (m/s)   Median Motor (Abd Poll Brev)   Wrist 3.3 4.1 0.8 6.5 11.7 44.4 Elbow Wrist 61 58 3   Elbow 6.9 7.8 0.9 5.6 10.2 45.1        Ulnar Motor (Abd Dig Min)   Wrist 2.7 2.5 0. 2 13.6 13.1 3.7 B Elbow Wrist 72 61 11   B Elbow 5.0 5.2 0.2 13.4 12.6 6.0 A Elbow B Elbow 63 67 4   A Elbow 6.6 6.7 0.1 12.9 12.2 5.4          Comparison Left/Right Comparison     Stim Site L Lat (ms) R Lat (ms) L-R Lat (ms) L Amp (µV) R Amp (µV) L-R Amp (%)   Median/Ulnar Dig IV Comparison (Digit 4 - 14cm)   Median Wr 3.5 3.6 0.1 14.9 5.6 62.4   Ulnar Wr 2.8 2.5 0.3 22.0 18.8 14.5         Waveforms:                                   [facility]  OFFICE PROCEDURE PROGRESS NOTE        Chart reviewed for the following:   IDavid, have reviewed the History, Physical and updated the Allergic reactions for [facility]     TIME OUT performed immediately prior to start of procedure:   IDavid, have performed the following reviews on [facility] prior to the start of the procedure:            * Patient was identified by name and date of birth   * Agreement on procedure being performed was verified  * Risks and Benefits explained to the patient  * Procedure site verified and marked as necessary  * Patient was positioned for comfort  * Consent was signed and verified     Time: 2:41 PM    Date of procedure: 6/3/2022    Procedure performed by:  Bibi Lyn MD    Provider accompanied by: Buck. Patient accompanied by: Self.     How tolerated by patient: tolerated the procedure well with no complications    Post Procedural Pain Scale: 0 - No Hurt    Comments: none    Written by Cherrie Gibbons, [address] as dictated by David Espinal MD

## 2022-06-03 NOTE — LETTER
6/6/2022    Patient: Dom Veras   YOB: 1975   Date of Visit: 6/3/2022     Romina Martinez MD  [address] [address]  Via Fax: [address] [address]  Via Fax: [phone]     Diane Lieberman, [address] [place] [address] [address] [address]  Via Fax: [phone]    Dear MD [facility],      Thank you for referring Ms. Dom Veras to [address] [place] [address] for evaluation. My notes for this consultation are attached. If you have questions, please do not hesitate to call me. I look forward to following your patient along with you.       Sincerely,    Bryn Jimenez MD

## 2022-06-06 DIAGNOSIS — R20.2 NUMBNESS AND TINGLING IN BOTH HANDS: ICD-10-CM

## 2022-06-06 DIAGNOSIS — R20.0 NUMBNESS AND TINGLING IN BOTH HANDS: ICD-10-CM

## 2024-12-10 ENCOUNTER — HOSPITAL ENCOUNTER (OUTPATIENT)
Facility: HOSPITAL | Age: 49
Setting detail: RECURRING SERIES
Discharge: HOME | End: 2024-12-13
Payer: OTHER GOVERNMENT

## 2024-12-10 PROCEDURE — 97110 THERAPEUTIC EXERCISES: CPT

## 2024-12-10 PROCEDURE — 97161 PT EVAL LOW COMPLEX 20 MIN: CPT

## 2024-12-10 NOTE — PROGRESS NOTES
[facility] [address]  [address]  [place] [address]  [phone] fax    Plan of Care / Statement of Necessity for Physical Therapy Services     Patient Name: Isacc Loaiza : 1975   Medical   Diagnosis: Pain in left knee [M25.562] Treatment Diagnosis: M25.562  LEFT KNEE PAIN       Onset Date: 10/22/24 (referral date)  Payor :  Payor: [employer] / Plan: [employer] / Product Type: *No Product type* /    Referral Source: Rafal Crisostomo PA-C Start of Care (SOC): 12/10/2024   Prior Hospitalization: See medical history Provider #: [id number]   Prior Level of Function: Independent with all functional mobility, ADLs, IADLs; working out on machines at the gym    Comorbidities: Restless leg syndrome, h/o , h/o L frozen shoulder, h/o B knee pain        Assessment / key information:  Patient is a pleasant 49 year old female with c/o L knee pain/swelling. Patient ambulates in clinic space with no AD; ambulates with antalgic gait pattern with WB on L. Patient presents with L ant/lat knee pain, gross LLE weakness, B hip abd weakness, limited AROM L knee flex, decreased flexibility HS and quad, increased swelling in ant/sup L knee, and impaired functional mobility, which affects her QOL. Patient presents with (+) meniscus cluster testing, along with L lateral joint line TTP and reports of clicking/catching and occasional knee buckling; would require further imaging to confirm meniscal involvement. Patient also reports h/o LBP and reports increased LBP with Ely test. Patient provided with HEP printout and performs initial exercises with moderate vc/tc and no adverse effect. Patient education on anatomy of present condition, symptom modulation, activity modification, HEP and importance of compliance, role of PT, and POC. Patient will benefit from skilled PT to address the above deficits and improve functional mobility so that she can return to ADLs, ambulation,

## 2024-12-10 NOTE — PROGRESS NOTES
PT DAILY TREATMENT NOTE/KNEE EVAL       Patient Name: Isacc Loaiza    Date: 12/10/2024    : 1975  Insurance: Payor: [employer] / Plan: [employer] / Product Type: *No Product type* /      Patient  verified yes     Visit #   Current / Total 1 10   Time   In / Out 0934 1010   Pain   In / Out 7/10 7/10   Subjective Functional Status/Changes: Pt arrives to PT IE with c/o L knee pain/swelling.     Treatment Area: Pain in left knee [M25.562]  If an interpreting service is utilized for treatment of this patient, the contents of this document represent the material reviewed with the patient via the .     SUBJECTIVE  Any medication changes, allergies to medications, adverse drug reactions, diagnosis change, or new procedure performed?: [x] No    [] Yes (see summary sheet for update)    Subjective Info:  HPI: Pt with c/o L leg/knee pain and swelling. Does weight lifting and feels pain for several days after. Feels L thigh is swollen, soft, warm to touch. X-ray (-) for fracture. Does have restless leg syndrome. Does report h/o LBP as well. Crumpling sensation with bending knee.   Current Pain: 7/10 Best Pain: 7/10 Worst Pain: 9/10  Constant/Intermittent: Constant, but severity fluctuates   Progression since onset: Worsening over time  Pain Location/Description: L ant knee   Current Symptoms: Pain, swelling, warmth  Current Mobility: Amb with no AD  Aggravating/Relieving Factors: Aggravating: standing 2-3 hours, weight lifting, walking the dog, waking up in the morning   Relieving: vibration machine before sleep, pain patches at work   DORINDA: No specific DORINDA  Previous Treatment: PT for knees about 5 years ago   Self Care: Independent  Activity/Participation Limitations: Working - squatting/standing/walking; 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

## 2024-12-16 ENCOUNTER — HOSPITAL ENCOUNTER (OUTPATIENT)
Facility: HOSPITAL | Age: 49
Setting detail: RECURRING SERIES
Discharge: HOME | End: 2024-12-19
Payer: OTHER GOVERNMENT

## 2024-12-16 PROCEDURE — 97112 NEUROMUSCULAR REEDUCATION: CPT

## 2024-12-16 PROCEDURE — 97110 THERAPEUTIC EXERCISES: CPT

## 2024-12-16 PROCEDURE — 97530 THERAPEUTIC ACTIVITIES: CPT

## 2024-12-16 PROCEDURE — 97535 SELF CARE MNGMENT TRAINING: CPT

## 2024-12-16 NOTE — PROGRESS NOTES
PHYSICAL / OCCUPATIONAL THERAPY - DAILY TREATMENT NOTE    Patient Name: Isacc Loaiza    Date: 2024    : 1975  Insurance: Payor: [employer] / Plan: [employer] / Product Type: *No Product type* /      Patient  verified Yes     Visit #   Current / Total 2 10   Time   In / Out 935 1016   Pain   In / Out 9 8.5   Subjective Functional Status/Changes: Pt reports she's limping and her knee swells and gets warm at the end of the day.     TREATMENT AREA =  Pain in left knee [M25.562]     OBJECTIVE    Ice (UNBILLED):  location/position: supine with wedge, left knee     Min Rationale   5 decrease inflammation and decrease pain to improve patient's ability to progress to PLOF and address remaining functional goals.     Skin assessment post-treatment:   Intact     Therapeutic Procedures:    Tx Min Billable or 1:1 Min (if diff from Tx Min) Procedure, Rationale, Specifics   10  90857 Therapeutic Exercise (timed):  increase ROM, strength, coordination, balance, and proprioception to improve patient's ability to progress to PLOF and address remaining functional goals. (see flow sheet as applicable)     Details if applicable:       8  63670 Neuromuscular Re-Education (timed):  improve balance, coordination, kinesthetic sense, posture, core stability and proprioception to improve patient's ability to develop conscious control of individual muscles and awareness of position of extremities in order to progress to PLOF and address remaining functional goals. (see flow sheet as applicable)     Details if applicable:  quad/glut re-ed   10  90938 Therapeutic Activity (timed):  use of dynamic activities replicating functional movements to increase ROM, strength, coordination, balance, and proprioception in order to improve patient's ability to progress to PLOF and address remaining functional goals.  (see flow sheet as applicable)     Details if applicable:  standing functional hip strength   8  91585 Self Care/Home

## 2024-12-17 ENCOUNTER — HOSPITAL ENCOUNTER (OUTPATIENT)
Facility: HOSPITAL | Age: 49
Setting detail: RECURRING SERIES
Discharge: HOME | End: 2024-12-20
Payer: OTHER GOVERNMENT

## 2024-12-17 PROCEDURE — 97530 THERAPEUTIC ACTIVITIES: CPT

## 2024-12-17 PROCEDURE — 97110 THERAPEUTIC EXERCISES: CPT

## 2024-12-17 PROCEDURE — 97535 SELF CARE MNGMENT TRAINING: CPT

## 2024-12-17 PROCEDURE — 97112 NEUROMUSCULAR REEDUCATION: CPT

## 2024-12-17 NOTE — PROGRESS NOTES
Current: reports compliance [Date assessed: 12/16/24]  Long Term Goals: To be accomplished in 5 weeks  Patient will be able to improve AROM in L knee to at least 120 deg to improve patient's ability to perform transfers and ambulation at home.               Eval: 102 deg L p! (130 deg R)      Pt will be able to improve strength in L hip flex, knee flex/ext to at least 5/5 to improve patient's ability to perform ambulation and stair negotiation at home & community.   Eval: Hip flex 4+/5 L, Knee flex 4+/5 L, Knee ext 4/5 L (5/5 on R)      Pt will be able to improve strength in B hip abd/ext to at least 5/5 to improve patient's ability to walk her dog and perform gym workouts without pain or limitation.   Eval: Hip ABD 4-/5 L, 4/5 R; Hip Ext 4-/5 L, 5/5 R      Pt will be able to perform x10 functional squats with proper body mechanics and no increase in L knee pain, so that she can perform household chores without limitation.               Eval: x5 squats with minimal knee flex and increase hip hinge/trunk flex, increased lat knee p!      Pt will report compliance with home HEP at end of care to enhance carry over of fuctional gains made with skilled therapy services in order to improve quality of life.               Eval: Established first HEP at [facility]  Patent reports compliance 2x per day -12/17 24      Pt will improve LEFS score to >/= 19 to demonstrate improvement in patient's ability to perform unrestricted ADLs/IADLs at home & community.              Eval: 10  Next PN/ RC due 01/08/25  Auth due (visit number/ date) 12 more by 02/21/25    PLAN  - Continue Plan of Care    Teto Chavez PTA    12/17/2024    2:41 PM  If an interpreting service was utilized for treatment of this patient, the contents of this document represent the material reviewed with the patient via the .     Future Appointments   Date Time Provider Department Center   12/17/2024  2:50 PM Teto Chavez PTA [facility]   12/23/2024

## 2024-12-23 ENCOUNTER — HOSPITAL ENCOUNTER (OUTPATIENT)
Facility: HOSPITAL | Age: 49
Setting detail: RECURRING SERIES
Discharge: HOME | End: 2024-12-26
Payer: OTHER GOVERNMENT

## 2024-12-23 PROCEDURE — 97535 SELF CARE MNGMENT TRAINING: CPT

## 2024-12-23 PROCEDURE — 97530 THERAPEUTIC ACTIVITIES: CPT

## 2024-12-23 PROCEDURE — 97110 THERAPEUTIC EXERCISES: CPT

## 2024-12-23 NOTE — PROGRESS NOTES
strength, coordination, balance, and proprioception in order to improve patient's ability to progress to PLOF and address remaining functional goals.  (see flow sheet as applicable)     Details if applicable:     10  62277 Self Care/Home Management (timed):  improve patient knowledge and understanding of home injury/symptom/pain management, positioning, posture/ergonomics, home safety, activity modification, transfer techniques, and joint protection strategies  to improve patient's ability to progress to PLOF and address remaining functional goals.  (see flow sheet as applicable)     Details if applicable:     31  [facility] Totals Reminder: bill using total billable min of TIMED therapeutic procedures (example: do not include dry needle or estim unattended, both untimed codes, in totals to left)  8-22 min = 1 unit; 23-37 min = 2 units; 38-52 min = 3 units; 53-67 min = 4 units; 68-82 min = 5 units   Total Total     [x]  Patient Education billed concurrently with other procedures   [x] Review HEP    [] Progressed/Changed HEP, detail:    [] Other detail:       Objective Information/Functional Measures/Assessment    Pt pain was increased due to prolonged standing at work, so her exercise tolerance was decreased today. Pt portrays antalgic gait secondary to pain in overall left knee especially when weight bearing. pt also displays toeing out & bouts of instability with toe raises leading to potential quad weakness.Pt education on work ergonomics, altered HEP & pain management & posture. Will continue to progress to PLOF as tolerated.    Patient will continue to benefit from skilled PT / OT services to modify and progress therapeutic interventions, analyze and address functional mobility deficits, analyze and address ROM deficits, analyze and address strength deficits, analyze and address soft tissue restrictions, analyze and cue for proper movement patterns, analyze and modify for postural abnormalities, analyze and address

## 2024-12-26 ENCOUNTER — HOSPITAL ENCOUNTER (OUTPATIENT)
Facility: HOSPITAL | Age: 49
Setting detail: RECURRING SERIES
Discharge: HOME | End: 2024-12-29
Payer: OTHER GOVERNMENT

## 2024-12-26 PROCEDURE — 97530 THERAPEUTIC ACTIVITIES: CPT

## 2024-12-26 PROCEDURE — 97535 SELF CARE MNGMENT TRAINING: CPT

## 2024-12-26 PROCEDURE — 97110 THERAPEUTIC EXERCISES: CPT

## 2024-12-26 NOTE — PROGRESS NOTES
use of dynamic activities replicating functional movements to increase ROM, strength, coordination, balance, and proprioception in order to improve patient's ability to progress to PLOF and address remaining functional goals.  (see flow sheet as applicable)     Details if applicable:     8 8 30493 Self Care/Home Management (timed):  improve patient knowledge and understanding of home injury/symptom/pain management, positioning, posture/ergonomics, activity modification, and joint protection strategies  to improve patient's ability to progress to PLOF and address remaining functional goals.  (see flow sheet as applicable)     Details if applicable:     40 40 [facility] Totals Reminder: bill using total billable min of TIMED therapeutic procedures (example: do not include dry needle or estim unattended, both untimed codes, in totals to left)  8-22 min = 1 unit; 23-37 min = 2 units; 38-52 min = 3 units; 53-67 min = 4 units; 68-82 min = 5 units   Total Total     [x]  Patient Education billed concurrently with other procedures   [x] Review HEP    [] Progressed/Changed HEP, detail:    [] Other detail:       Objective Information/Functional Measures/Assessment    Patient verbalizes HEP compliance 2x/day meeting STG#2 and LTG #5. Patient participated in todays session without adverse effects. Patient reports increased ease with HR/TR - possibly progress NV. Patient able to perform step-ups (forward/lateral) today, TRX squats, and prone hip ext. without increase in pain. Therapist instructed in the addition of heel downs with good patient response. Reports overall decrease in symptoms (6/10) after today's session and further decrease (5.5/10) in symptoms after modalities. Continue to progress as able.    Patient will continue to benefit from skilled PT / OT services to modify and progress therapeutic interventions, analyze and address functional mobility deficits, analyze and address ROM deficits, analyze and address strength

## 2024-12-31 ENCOUNTER — APPOINTMENT (OUTPATIENT)
Facility: HOSPITAL | Age: 49
End: 2024-12-31
Payer: OTHER GOVERNMENT

## 2025-01-02 ENCOUNTER — APPOINTMENT (OUTPATIENT)
Facility: HOSPITAL | Age: 50
End: 2025-01-02
Payer: OTHER GOVERNMENT

## 2025-01-06 ENCOUNTER — HOSPITAL ENCOUNTER (OUTPATIENT)
Facility: HOSPITAL | Age: 50
Setting detail: RECURRING SERIES
Discharge: HOME | End: 2025-01-09
Payer: OTHER GOVERNMENT

## 2025-01-06 PROCEDURE — 97535 SELF CARE MNGMENT TRAINING: CPT

## 2025-01-06 PROCEDURE — 97112 NEUROMUSCULAR REEDUCATION: CPT

## 2025-01-06 PROCEDURE — 97110 THERAPEUTIC EXERCISES: CPT

## 2025-01-06 PROCEDURE — 97530 THERAPEUTIC ACTIVITIES: CPT

## 2025-01-06 NOTE — PROGRESS NOTES
PHYSICAL / OCCUPATIONAL THERAPY - DAILY TREATMENT NOTE    Patient Name: Isacc Loaiza    Date: 2025    : 1975  Insurance: Payor: [employer] / Plan: [employer] / Product Type: *No Product type* /      Patient  verified Yes     Visit #   Current / Total  10   Time   In / Out 0931 1012   Pain   In / Out 8/10 7/10   Subjective Functional Status/Changes: \"My leg is still getting fluffy and swollen on my left\"     TREATMENT AREA =  Pain in left knee [M25.562]     OBJECTIVE    Therapeutic Procedures:    Tx Min Billable or 1:1 Min (if diff from Tx Min) Procedure, Rationale, Specifics   10 10 32278 Therapeutic Exercise (timed):  increase ROM, strength, coordination, balance, and proprioception to improve patient's ability to progress to PLOF and address remaining functional goals. (see flow sheet as applicable)     Details if applicable:       10 10 20041 Neuromuscular Re-Education (timed):  improve balance, coordination, kinesthetic sense, posture, core stability and proprioception to improve patient's ability to develop conscious control of individual muscles and awareness of position of extremities in order to progress to PLOF and address remaining functional goals. (see flow sheet as applicable)     Details if applicable:  hip stability   13 13 33701 Therapeutic Activity (timed):  use of dynamic activities replicating functional movements to increase ROM, strength, coordination, balance, and proprioception in order to improve patient's ability to progress to PLOF and address remaining functional goals.  (see flow sheet as applicable)     Details if applicable:  progress update   8 8 52091 Self Care/Home Management (timed):  improve patient knowledge and understanding of home injury/symptom/pain management, positioning, posture/ergonomics, home safety, activity modification, transfer techniques, and joint protection strategies  to improve patient's ability to progress to PLOF and address remaining 
appropriate [Date Assessed: 12/26/24]     Pt will improve LEFS score to >/= 19 to demonstrate improvement in patient's ability to perform unrestricted ADLs/IADLs at home & community.              Eval: 10   MET: 41/80     Non-Medicare, can change goals, can adjust or add frequency duration, no signature required      New Goals to be achieved in __5__ WEEKS    Pt will be able to report a </= 6/10 pain rating at worst in L knee to improve patient's ability to tolerate prolonged functional activities at home.     PN: 9/10 at worst (01/06/25)     2.  Pt will be able to improve strength in L hip flex, knee flex/ext to at least 5/5 to improve patient's ability to perform ambulation and stair negotiation at home & community.   PN: Hip flex 4+/5 L, Knee flex 5/5, Knee ext 4+/5 L (01/06/25)     3.  Pt will be able to improve strength in B hip abd/ext to at least 5/5 to improve patient's ability to walk her dog and perform gym workouts without pain or limitation.     PN: Hip ABD 4+/5 B; Hip Ext 4+/5 B    4.   Pt will be able to perform x10 functional squats with proper body mechanics and no increase in L knee pain, so that she can perform household chores without limitation.     PN: x10 squats with moderate depth, intermittent UE support on L thigh/knee, weight shifting towards L, lat L knee pain    5.   Pt will report compliance with home HEP at end of care to enhance carry over of fuctional gains made with skilled therapy services in order to improve quality of life.   PN: verbalizes compliance 2x/day; will update as appropriate [Date Assessed: 12/26/24]     6.   Pt will improve LEFS score to >/= 50 to demonstrate improvement in patient's ability to perform unrestricted ADLs/IADLs at home & community. (Updated)    PN: 41/80     Frequency / Duration:   Patient to be seen   2   times per week for   5    WEEKS    RECOMMENDATIONS  Patient would benefit from the continuation of skilled rehab interventions for functional progress

## 2025-01-09 ENCOUNTER — HOSPITAL ENCOUNTER (OUTPATIENT)
Facility: HOSPITAL | Age: 50
Setting detail: RECURRING SERIES
Discharge: HOME | End: 2025-01-12
Payer: OTHER GOVERNMENT

## 2025-01-09 PROCEDURE — 97535 SELF CARE MNGMENT TRAINING: CPT

## 2025-01-09 PROCEDURE — 97530 THERAPEUTIC ACTIVITIES: CPT

## 2025-01-09 PROCEDURE — 97110 THERAPEUTIC EXERCISES: CPT

## 2025-01-09 PROCEDURE — 97112 NEUROMUSCULAR REEDUCATION: CPT

## 2025-01-09 NOTE — PROGRESS NOTES
PHYSICAL / OCCUPATIONAL THERAPY - DAILY TREATMENT NOTE    Patient Name: Isacc Loaiza    Date: 2025    : 1975  Insurance: Payor: [employer] / Plan: [employer] / Product Type: *No Product type* /      Patient  verified Yes     Visit #   Current / Total 7 10   Time   In / Out 9:31 10:22   Pain   In / Out 7 0   Subjective Functional Status/Changes: Pt reports continued swelling in the left leg.      TREATMENT AREA =  Pain in left knee [M25.562]     OBJECTIVE    Modalities Rationale:     decrease pain and increase tissue extensibility to improve patient's ability to progress to PLOF and address remaining functional goals.    10 min  unbill []  Ice     []  Heat    location/position: Supine with legs on wedge, left knee   Skin assessment post-treatment (if applicable):    []  intact    []  redness- no adverse reaction                 []redness - adverse reaction:          Therapeutic Procedures:    Tx Min Billable or 1:1 Min (if diff from Tx Min) Procedure, Rationale, Specifics   10  13437 Therapeutic Exercise (timed):  increase ROM, strength, coordination, balance, and proprioception to improve patient's ability to progress to PLOF and address remaining functional goals. (see flow sheet as applicable)     Details if applicable:       11  34823 Neuromuscular Re-Education (timed):  improve balance, coordination, kinesthetic sense, posture, core stability and proprioception to improve patient's ability to develop conscious control of individual muscles and awareness of position of extremities in order to progress to PLOF and address remaining functional goals. (see flow sheet as applicable)     Details if applicable:    10  58274 Therapeutic Activity (timed):  use of dynamic activities replicating functional movements to increase ROM, strength, coordination, balance, and proprioception in order to improve patient's ability to progress to PLOF and address remaining functional goals.  (see flow sheet as

## 2025-01-14 ENCOUNTER — HOSPITAL ENCOUNTER (OUTPATIENT)
Facility: HOSPITAL | Age: 50
Setting detail: RECURRING SERIES
Discharge: HOME | End: 2025-01-17
Payer: OTHER GOVERNMENT

## 2025-01-14 PROCEDURE — 97535 SELF CARE MNGMENT TRAINING: CPT

## 2025-01-14 PROCEDURE — 97110 THERAPEUTIC EXERCISES: CPT

## 2025-01-14 PROCEDURE — 97112 NEUROMUSCULAR REEDUCATION: CPT

## 2025-01-14 PROCEDURE — 97530 THERAPEUTIC ACTIVITIES: CPT

## 2025-01-14 NOTE — PROGRESS NOTES
decrease. Pt education on not using heat even if it feels good, due to edema presence, instead swapping the heat for ice for up to 20 minutes per hour coupled w elevation. Pt educated to watch out for coupled sx of swelling,redness & warmth for potential infection. Verbal exercises added to HEP. Pt reported complete diminishment of sx post session icing & elevation.    Patient will continue to benefit from skilled PT / OT services to modify and progress therapeutic interventions, analyze and address functional mobility deficits, analyze and address ROM deficits, analyze and address strength deficits, analyze and address soft tissue restrictions, analyze and cue for proper movement patterns, analyze and modify for postural abnormalities, analyze and address imbalance/dizziness, and instruct in home and community integration to address functional deficits and attain remaining goals.    Progress toward goals / Updated goals:  []  See Progress Note/Recertification    Pt will be able to report a </= 6/10 pain rating at worst in L knee to improve patient's ability to tolerate prolonged functional activities at home.                PN: 9/10 at worst (01/06/25)               Current: 7/10 pre session today 1/9/2025     2.  Pt will be able to improve strength in L hip flex, knee flex/ext to at least 5/5 to improve patient's ability to perform ambulation and stair negotiation at home & community.   PN: Hip flex 4+/5 L, Knee flex 5/5, Knee ext 4+/5 L (01/06/25)      3.  Pt will be able to improve strength in B hip abd/ext to at least 5/5 to improve patient's ability to walk her dog and perform gym workouts without pain or limitation.                PN: Hip ABD 4+/5 B; Hip Ext 4+/5 B     4.   Pt will be able to perform x10 functional squats with proper body mechanics and no increase in L knee pain, so that she can perform household chores without limitation.                PN: x10 squats with moderate depth, intermittent UE

## 2025-01-16 ENCOUNTER — HOSPITAL ENCOUNTER (OUTPATIENT)
Facility: HOSPITAL | Age: 50
Setting detail: RECURRING SERIES
Discharge: HOME | End: 2025-01-19
Payer: OTHER GOVERNMENT

## 2025-01-16 PROCEDURE — 97112 NEUROMUSCULAR REEDUCATION: CPT

## 2025-01-16 PROCEDURE — 97535 SELF CARE MNGMENT TRAINING: CPT

## 2025-01-16 PROCEDURE — 97530 THERAPEUTIC ACTIVITIES: CPT

## 2025-01-16 PROCEDURE — 97110 THERAPEUTIC EXERCISES: CPT

## 2025-01-16 NOTE — PROGRESS NOTES
PHYSICAL / OCCUPATIONAL THERAPY - DAILY TREATMENT NOTE    Patient Name: Isacc Loaiza    Date: 2025    : 1975  Insurance: Payor: [employer] / Plan: [employer] / Product Type: *No Product type* /      Patient  verified Yes     Visit #   Current / Total 3 10   Time   In / Out 330 422   Pain   In / Out 7 0   Subjective Functional Status/Changes: Pt reports some stiffness L knee today due to the cold weather.     TREATMENT AREA =  Pain in left knee [M25.562]     OBJECTIVE      Modalities Rationale:     decrease edema, decrease inflammation, and decrease pain to improve patient's ability to progress to PLOF and address remaining functional goals.                  min [] Estim Unattended, type/location:                                                 []  w/ice    []  w/heat     min [] Estim Attended, type/location:                                                []  w/US     []  w/ice    []  w/heat    []  TENS insruct       min []  Mechanical Traction: type/lbs                    []  pro   []  sup   []  int   []  cont    []  before manual    []  after manual     min []  Ultrasound, settings/location:      10 min  unbill [x]  Ice     []  Heat    location/position: Supine w bolster under knees, cp on left knee & hip      min []  Paraffin,  details:       min []  Vasopneumatic Device, press/temp:       min []  Whirlpool / Fluido:     If using vaso (only need to measure limb vaso being performed on)      pre-treatment girth :       post-treatment girth :       measured at (landmark location) :       min []  Other:     Skin assessment post-treatment:   Intact          Therapeutic Procedures:    Tx Min Billable or 1:1 Min (if diff from Tx Min) Procedure, Rationale, Specifics   12  64851 Therapeutic Exercise (timed):  increase ROM, strength, coordination, balance, and proprioception to improve patient's ability to progress to PLOF and address remaining functional goals. (see flow sheet as applicable)     Details

## 2025-01-20 ENCOUNTER — HOSPITAL ENCOUNTER (OUTPATIENT)
Facility: HOSPITAL | Age: 50
Setting detail: RECURRING SERIES
Discharge: HOME | End: 2025-01-23
Payer: OTHER GOVERNMENT

## 2025-01-20 PROCEDURE — 97110 THERAPEUTIC EXERCISES: CPT

## 2025-01-20 PROCEDURE — 97112 NEUROMUSCULAR REEDUCATION: CPT

## 2025-01-20 PROCEDURE — 97530 THERAPEUTIC ACTIVITIES: CPT

## 2025-01-20 NOTE — PROGRESS NOTES
PHYSICAL / OCCUPATIONAL THERAPY - DAILY TREATMENT NOTE    Patient Name: Isacc Loaiza    Date: 2025    : 1975  Insurance: Payor: [employer] / Plan: [employer] / Product Type: *No Product type* /      Patient  verified Yes     Visit #   Current / Total 4 10   Time   In / Out 415 450   Pain   In / Out 0 0   Subjective Functional Status/Changes: Pt reports no pain today.     TREATMENT AREA =  Pain in left knee [M25.562]     OBJECTIVE         Therapeutic Procedures:    Tx Min Billable or 1:1 Min (if diff from Tx Min) Procedure, Rationale, Specifics   15  27244 Therapeutic Exercise (timed):  increase ROM, strength, coordination, balance, and proprioception to improve patient's ability to progress to PLOF and address remaining functional goals. (see flow sheet as applicable)     Details if applicable:       13  23707 Neuromuscular Re-Education (timed):  improve balance, coordination, kinesthetic sense, posture, core stability and proprioception to improve patient's ability to develop conscious control of individual muscles and awareness of position of extremities in order to progress to PLOF and address remaining functional goals. (see flow sheet as applicable)     Details if applicable:     8  40581 Therapeutic Activity (timed):  use of dynamic activities replicating functional movements to increase ROM, strength, coordination, balance, and proprioception in order to improve patient's ability to progress to PLOF and address remaining functional goals.  (see flow sheet as applicable)     Details if applicable:                 Details if applicable:     35  [facility] Totals Reminder: bill using total billable min of TIMED therapeutic procedures (example: do not include dry needle or estim unattended, both untimed codes, in totals to left)  8-22 min = 1 unit; 23-37 min = 2 units; 38-52 min = 3 units; 53-67 min = 4 units; 68-82 min = 5 units   Total Total     [x]  Patient Education billed concurrently with other

## 2025-01-21 ENCOUNTER — APPOINTMENT (OUTPATIENT)
Facility: HOSPITAL | Age: 50
End: 2025-01-21
Payer: OTHER GOVERNMENT

## 2025-01-23 ENCOUNTER — HOSPITAL ENCOUNTER (OUTPATIENT)
Facility: HOSPITAL | Age: 50
Setting detail: RECURRING SERIES
Discharge: HOME | End: 2025-01-26
Payer: OTHER GOVERNMENT

## 2025-01-23 PROCEDURE — 97110 THERAPEUTIC EXERCISES: CPT

## 2025-01-23 PROCEDURE — 97112 NEUROMUSCULAR REEDUCATION: CPT

## 2025-01-23 PROCEDURE — 97530 THERAPEUTIC ACTIVITIES: CPT

## 2025-01-23 NOTE — PROGRESS NOTES
PHYSICAL / OCCUPATIONAL THERAPY - DAILY TREATMENT NOTE    Patient Name: Isacc Loaiza    Date: 2025    : 1975  Insurance: Payor: [employer] / Plan: [employer] / Product Type: *No Product type* /      Patient  verified Yes     Visit #   Current / Total 5 10   Time   In / Out 330 411   Pain   In / Out 3 2   Subjective Functional Status/Changes: Pt reports feeling much better overall     TREATMENT AREA =  Pain in left knee [M25.562]     OBJECTIVE         Therapeutic Procedures:    Tx Min Billable or 1:1 Min (if diff from Tx Min) Procedure, Rationale, Specifics   15  77823 Therapeutic Exercise (timed):  increase ROM, strength, coordination, balance, and proprioception to improve patient's ability to progress to PLOF and address remaining functional goals. (see flow sheet as applicable)     Details if applicable:       15  92840 Neuromuscular Re-Education (timed):  improve balance, coordination, kinesthetic sense, posture, core stability and proprioception to improve patient's ability to develop conscious control of individual muscles and awareness of position of extremities in order to progress to PLOF and address remaining functional goals. (see flow sheet as applicable)     Details if applicable:     9  46888 Therapeutic Activity (timed):  use of dynamic activities replicating functional movements to increase ROM, strength, coordination, balance, and proprioception in order to improve patient's ability to progress to PLOF and address remaining functional goals.  (see flow sheet as applicable)     Details if applicable:            Details if applicable:            Details if applicable:     39  [facility] Totals Reminder: bill using total billable min of TIMED therapeutic procedures (example: do not include dry needle or estim unattended, both untimed codes, in totals to left)  8-22 min = 1 unit; 23-37 min = 2 units; 38-52 min = 3 units; 53-67 min = 4 units; 68-82 min = 5 units   Total Total     [x]  Patient

## 2025-01-28 ENCOUNTER — HOSPITAL ENCOUNTER (OUTPATIENT)
Facility: HOSPITAL | Age: 50
Setting detail: RECURRING SERIES
Discharge: HOME | End: 2025-01-31
Payer: OTHER GOVERNMENT

## 2025-01-28 PROCEDURE — 97530 THERAPEUTIC ACTIVITIES: CPT

## 2025-01-28 PROCEDURE — 97112 NEUROMUSCULAR REEDUCATION: CPT

## 2025-01-28 PROCEDURE — 97110 THERAPEUTIC EXERCISES: CPT

## 2025-01-28 NOTE — PROGRESS NOTES
modify for postural abnormalities, analyze and address imbalance/dizziness, and instruct in home and community integration to address functional deficits and attain remaining goals.    Progress toward goals / Updated goals:  []  See Progress Note/Recertification    Pt will be able to report a </= 6/10 pain rating at worst in L knee to improve patient's ability to tolerate prolonged functional activities at home.                PN: 9/10 at worst (01/06/25)               Current: 7/10 pre session today 1/9/2025     2.  Pt will be able to improve strength in L hip flex, knee flex/ext to at least 5/5 to improve patient's ability to perform ambulation and stair negotiation at home & community.   PN: Hip flex 4+/5 L, Knee flex 5/5, Knee ext 4+/5 L (01/06/25)      3.  Pt will be able to improve strength in B hip abd/ext to at least 5/5 to improve patient's ability to walk her dog and perform gym workouts without pain or limitation.                PN: Hip ABD 4+/5 B; Hip Ext 4+/5 B     4.   Pt will be able to perform x10 functional squats with proper body mechanics and no increase in L knee pain, so that she can perform household chores without limitation.                PN: x10 squats with moderate depth, intermittent UE support on L thigh/knee, weight shifting towards L, lat L knee pain     5.   Pt will report compliance with home HEP at end of care to enhance carry over of fuctional gains made with skilled therapy services in order to improve quality of life.   PN: verbalizes compliance 2x/day; will update as appropriate [Date Assessed: 12/26/24]   Current: pt continues to report daily compliance- 1/23/25     6.   Pt will improve LEFS score to >/= 50 to demonstrate improvement in patient's ability to perform unrestricted ADLs/IADLs at home & community. (Updated)               PN: 41/80     Next PN/ RC due 2/4/25  Auth due (visit number/ date) 5 by 02/04/25    PLAN  - Continue Plan of Care    Jhony Herndon, PT

## 2025-01-30 ENCOUNTER — HOSPITAL ENCOUNTER (OUTPATIENT)
Facility: HOSPITAL | Age: 50
Setting detail: RECURRING SERIES
End: 2025-01-30
Payer: OTHER GOVERNMENT

## 2025-01-30 PROCEDURE — 97535 SELF CARE MNGMENT TRAINING: CPT

## 2025-01-30 PROCEDURE — 97110 THERAPEUTIC EXERCISES: CPT

## 2025-01-30 PROCEDURE — 97530 THERAPEUTIC ACTIVITIES: CPT

## 2025-01-30 PROCEDURE — 97112 NEUROMUSCULAR REEDUCATION: CPT

## 2025-01-30 NOTE — PROGRESS NOTES
PHYSICAL / OCCUPATIONAL THERAPY - DAILY TREATMENT NOTE    Patient Name: Isacc Loaiza    Date: 2025    : 1975  Insurance: Payor: [employer] / Plan: [employer]UM / Product Type: *No Product type* /      Patient  verified Yes     Visit #   Current / Total 7 10   Time   In / Out 0932 1011   Pain   In / Out 1/10 010   Subjective Functional Status/Changes: \"I feel much stronger\"      TREATMENT AREA =  Pain in left knee [M25.562]     OBJECTIVE    Therapeutic Procedures:    Tx Min Billable or 1:1 Min (if diff from Tx Min) Procedure, Rationale, Specifics   10 10 46128 Therapeutic Exercise (timed):  increase ROM, strength, coordination, balance, and proprioception to improve patient's ability to progress to PLOF and address remaining functional goals. (see flow sheet as applicable)     Details if applicable:       10 10 92016 Neuromuscular Re-Education (timed):  improve balance, coordination, kinesthetic sense, posture, core stability and proprioception to improve patient's ability to develop conscious control of individual muscles and awareness of position of extremities in order to progress to PLOF and address remaining functional goals. (see flow sheet as applicable)     Details if applicable:  hip stability, quad/HS re-ed    8 8 35866 Self Care/Home Management (timed):  improve patient knowledge and understanding of home injury/symptom/pain management, positioning, posture/ergonomics, home safety, activity modification, transfer techniques, and joint protection strategies  to improve patient's ability to progress to PLOF and address remaining functional goals.  (see flow sheet as applicable)      Details if applicable: pt ed    11 11 78782 Therapeutic Activity (timed):  use of dynamic activities replicating functional movements to increase ROM, strength, coordination, balance, and proprioception in order to improve patient's ability to progress to PLOF and address remaining functional goals.  (see flow

## 2025-02-04 ENCOUNTER — HOSPITAL ENCOUNTER (OUTPATIENT)
Facility: HOSPITAL | Age: 50
Setting detail: RECURRING SERIES
Discharge: HOME | End: 2025-02-07
Payer: OTHER GOVERNMENT

## 2025-02-04 PROCEDURE — 97535 SELF CARE MNGMENT TRAINING: CPT

## 2025-02-04 PROCEDURE — 97530 THERAPEUTIC ACTIVITIES: CPT

## 2025-02-04 NOTE — PROGRESS NOTES
PHYSICAL / OCCUPATIONAL THERAPY - DAILY TREATMENT NOTE    Patient Name: Isacc Loaiza    Date: 2025    : 1975  Insurance: Payor: [employer]UM / Plan: [employer]UM / Product Type: *No Product type* /      Patient  verified Yes     Visit #   Current / Total 8 10   Time   In / Out 0450 0518   Pain   In / Out 3/10 3/10   Subjective Functional Status/Changes: \"I'm ready for today to be my last day\"      TREATMENT AREA =  Pain in left knee [M25.562]     OBJECTIVE    Therapeutic Procedures:    Tx Min Billable or 1:1 Min (if diff from Tx Min) Procedure, Rationale, Specifics   16 16 39556 Therapeutic Activity (timed):  use of dynamic activities replicating functional movements to increase ROM, strength, coordination, balance, and proprioception in order to improve patient's ability to progress to PLOF and address remaining functional goals.  (see flow sheet as applicable)     Details if applicable:  progress update, d/c      12 12 83587 Self Care/Home Management (timed):  improve patient knowledge and understanding of home injury/symptom/pain management, positioning, posture/ergonomics, home safety, activity modification, transfer techniques, and joint protection strategies  to improve patient's ability to progress to PLOF and address remaining functional goals.  (see flow sheet as applicable)     Details if applicable:  pt ed   28 28 [facility] Totals Reminder: bill using total billable min of TIMED therapeutic procedures (example: do not include dry needle or estim unattended, both untimed codes, in totals to left)  8-22 min = 1 unit; 23-37 min = 2 units; 38-52 min = 3 units; 53-67 min = 4 units; 68-82 min = 5 units   Total Total     [x]  Patient Education billed concurrently with other procedures   [x] Review HEP    [] Progressed/Changed HEP, detail:    [] Other detail:       Objective Information/Functional Measures/Assessment    Patient is a pleasant 49 year old female with c/o L knee pain/swelling. Patient has 
Physical Therapy Discharge Instructions      [facility] [address]  [address]  [place] [address]  [phone] fax      Patient: Isacc Loaiza  : 1975      Continue Home Exercise Program 1-2 times per day for 3-4 weeks, then decrease to 3-4 times per week      Continue with    [x] Ice  as needed 2-3 times per day     [] Heat           Follow up with MD:     [] Upon completion of therapy     [x] As needed      Recommendations:     [x]   Return to activity with home program    []   Return to activity with the following modifications:       []Post Rehab Program    []Join Independent aquatic program     []Return to/join local gym        Additional Comments: Let us know if you have any questions!          Margaret Jerome, PT 2025 5:14 PM    
5/5, Knee flex 5/5, Knee ext 5/5 L (02/04/25)      3.  Pt will be able to improve strength in B hip abd/ext to at least 5/5 to improve patient's ability to walk her dog and perform gym workouts without pain or limitation.                PN: Hip ABD 4+/5 B; Hip Ext 4+/5 B               PROGRESSED: Hip ABD 4+/5 B; Hip ext 5/5 B (02/04/25)      4.   Pt will be able to perform x10 functional squats with proper body mechanics and no increase in L knee pain, so that she can perform household chores without limitation.                PN: x10 squats with moderate depth, intermittent UE support on L thigh/knee, weight shifting towards L, lat L knee pain              MET: x10 squats with moderate depth, no UE, good mechanics, symmetrical WS      5.   Pt will report compliance with home HEP at end of care to enhance carry over of fuctional gains made with skilled therapy services in order to improve quality of life.   PN: verbalizes compliance 2x/day; will update as appropriate [Date Assessed: 12/26/24]              Current: pt continues to report daily compliance- 1/23/25               MET: Updated HEP (02/04/25)      6.   Pt will improve LEFS score to >/= 50 to demonstrate improvement in patient's ability to perform unrestricted ADLs/IADLs at home & community. (Updated)               PN: 41/80               MET 68/80 (02/04/25)       RECOMMENDATIONS  We are discharging secondary to PROGRAM COMPLETE. Should patient require additional PT in the future, we would be happy to continue treatment with updated order from MD.      If you have any questions/comments please contact us directly at [phone].   Thank you for allowing us to assist in the care of your patient.  PTA signature  Margaret Jerome PT     Therapist Signature: Margaret Jerome PT Date: 02/04/25   Reporting Period: 01/06/25 - 02/04/25  Time: 5:24 PM

## 2025-02-06 ENCOUNTER — APPOINTMENT (OUTPATIENT)
Facility: HOSPITAL | Age: 50
End: 2025-02-06
Payer: OTHER GOVERNMENT

## (undated) DEVICE — SYR 10ML LUER LOK 1/5ML GRAD --

## (undated) DEVICE — MEDIA CONTRAST 10ML 200MG/ML 41%

## (undated) DEVICE — TRAY MYEL SFTY +

## (undated) DEVICE — (D)BNDG ADHESIVE FABRIC 3/4X3 -- DISC BY MFR USE ITEM 357960